# Patient Record
Sex: MALE | Race: BLACK OR AFRICAN AMERICAN | NOT HISPANIC OR LATINO | Employment: FULL TIME | URBAN - METROPOLITAN AREA
[De-identification: names, ages, dates, MRNs, and addresses within clinical notes are randomized per-mention and may not be internally consistent; named-entity substitution may affect disease eponyms.]

---

## 2019-07-15 ENCOUNTER — TRANSCRIBE ORDERS (OUTPATIENT)
Dept: LAB | Facility: HOSPITAL | Age: 54
End: 2019-07-15

## 2019-07-15 DIAGNOSIS — Z79.899 ENCOUNTER FOR LONG-TERM (CURRENT) USE OF OTHER MEDICATIONS: Primary | ICD-10-CM

## 2019-08-23 NOTE — PROGRESS NOTES
Assessment/Plan:    No problem-specific Assessment & Plan notes found for this encounter  {Assess/PlanSmartLinks:51426}      Subjective: Patient complain of lower back tightness   Referral to gastro pending   Patient given the phq9     Health Maintenance Due   Topic Date Due    Hepatitis C Screening  1965    Depression Screening PHQ  1965    CRC Screening: Colonoscopy  1965    BMI: Adult  07/18/1982    DTaP,Tdap,and Td Vaccines (1 - Tdap) 07/18/1985    INFLUENZA VACCINE  07/01/2019          Patient ID: Madison Flowers is a 53 y o  male  HPI    {Common ambulatory SmartLinks:41130}    Review of Systems      Objective: There were no vitals taken for this visit           Physical Exam

## 2019-08-27 ENCOUNTER — OFFICE VISIT (OUTPATIENT)
Dept: FAMILY MEDICINE CLINIC | Facility: CLINIC | Age: 54
End: 2019-08-27
Payer: COMMERCIAL

## 2019-08-27 VITALS
SYSTOLIC BLOOD PRESSURE: 110 MMHG | HEIGHT: 69 IN | BODY MASS INDEX: 25.21 KG/M2 | DIASTOLIC BLOOD PRESSURE: 80 MMHG | OXYGEN SATURATION: 98 % | RESPIRATION RATE: 16 BRPM | WEIGHT: 170.2 LBS | TEMPERATURE: 97.9 F | HEART RATE: 97 BPM

## 2019-08-27 DIAGNOSIS — Z23 ENCOUNTER FOR IMMUNIZATION: ICD-10-CM

## 2019-08-27 DIAGNOSIS — Z00.00 ANNUAL PHYSICAL EXAM: Primary | ICD-10-CM

## 2019-08-27 DIAGNOSIS — Z11.59 NEED FOR HEPATITIS C SCREENING TEST: ICD-10-CM

## 2019-08-27 DIAGNOSIS — Z13.228 SCREENING FOR ENDOCRINE, METABOLIC AND IMMUNITY DISORDER: ICD-10-CM

## 2019-08-27 DIAGNOSIS — Z13.6 SCREENING FOR CARDIOVASCULAR CONDITION: ICD-10-CM

## 2019-08-27 DIAGNOSIS — Z12.5 SCREENING FOR PROSTATE CANCER: ICD-10-CM

## 2019-08-27 DIAGNOSIS — F17.200 TOBACCO DEPENDENCE: ICD-10-CM

## 2019-08-27 DIAGNOSIS — Z12.2 ENCOUNTER FOR SCREENING FOR LUNG CANCER: ICD-10-CM

## 2019-08-27 DIAGNOSIS — Z13.29 SCREENING FOR ENDOCRINE, METABOLIC AND IMMUNITY DISORDER: ICD-10-CM

## 2019-08-27 DIAGNOSIS — Z13.1 SCREENING FOR DIABETES MELLITUS: ICD-10-CM

## 2019-08-27 DIAGNOSIS — Z12.11 SCREENING FOR COLON CANCER: ICD-10-CM

## 2019-08-27 DIAGNOSIS — Z13.0 SCREENING FOR ENDOCRINE, METABOLIC AND IMMUNITY DISORDER: ICD-10-CM

## 2019-08-27 PROBLEM — F41.9 ANXIETY: Status: ACTIVE | Noted: 2019-08-27

## 2019-08-27 PROBLEM — Z72.0 TOBACCO USER: Status: ACTIVE | Noted: 2017-12-04

## 2019-08-27 PROCEDURE — 90732 PPSV23 VACC 2 YRS+ SUBQ/IM: CPT

## 2019-08-27 PROCEDURE — 3725F SCREEN DEPRESSION PERFORMED: CPT | Performed by: FAMILY MEDICINE

## 2019-08-27 PROCEDURE — 99386 PREV VISIT NEW AGE 40-64: CPT | Performed by: FAMILY MEDICINE

## 2019-08-27 PROCEDURE — 90471 IMMUNIZATION ADMIN: CPT

## 2019-08-27 RX ORDER — BUSPIRONE HYDROCHLORIDE 15 MG/1
15 TABLET ORAL 3 TIMES DAILY
Refills: 0 | COMMUNITY
Start: 2019-08-20 | End: 2020-04-23

## 2019-08-27 RX ORDER — HYDROXYZINE PAMOATE 25 MG/1
CAPSULE ORAL
COMMUNITY
Start: 2018-11-12 | End: 2020-04-23

## 2019-08-27 NOTE — PROGRESS NOTES
ADULT ANNUAL PHYSICAL  [facility] - [facility]    NAME: Lily Aguilar  AGE: 53 y o  SEX: male  : 1965     DATE: 2019     Assessment and Plan:     Problem List Items Addressed This Visit     None      Visit Diagnoses     Annual physical exam    -  Primary    Screening for colon cancer        Relevant Orders    Occult Blood, Fecal Immunochemical    Screening for endocrine, metabolic and immunity disorder        Relevant Orders    CBC and Platelet    Comprehensive metabolic panel    TSH, 3rd generation with Free T4 reflex    Encounter for screening for lung cancer        Relevant Orders    CT lung screening program    Screening for prostate cancer        Relevant Orders    PSA, Total Screen    Screening for diabetes mellitus        Relevant Orders    Hemoglobin A1C    Screening for cardiovascular condition        Relevant Orders    Lipid panel    Encounter for immunization        Relevant Orders    PNEUMOCOCCAL POLYSACCHARIDE VACCINE 23-VALENT =>3YO SQ IM (Completed)    BMI 25 0-25 9,adult        Tobacco dependence        Need for hepatitis C screening test        Relevant Orders    Hepatitis C antibody          Immunizations and preventive care screenings were discussed with patient today  Appropriate education was printed on patient's after visit summary  Counseling:  BMI Counseling: Body mass index is 25 13 kg/m²  Discussed the patient's BMI with him  The BMI is above average  BMI counseling and education was provided to the patient  Nutrition recommendations include 3-5 servings of fruits/vegetables daily  Exercise recommendations include exercising 3-5 times per week  · Tobacco Cessation Counseling: Tobacco cessation counseling and education was provided  The patient is sincerely urged to quit consumption of tobacco  He is not ready to quit tobacco  The numerous health risks of tobacco consumption were discussed   If he decides to quit, there are a number of helpful adjunctive aids, and he can see me to discuss nicotine replacement therapy, chantix, or bupropion anytime in the future  Return in 1 year (on 8/27/2020)  Chief Complaint:     Chief Complaint   Patient presents with    Annual Exam      History of Present Illness:     Adult Annual Physical   Patient here for a comprehensive physical exam  The patient reports no problems  Diet and Physical Activity  · Diet/Nutrition: well balanced diet and limited fruits/vegetables  · Exercise: 3-4 times a week on average  Depression Screening  PHQ-9 Depression Screening    PHQ-9:    Frequency of the following problems over the past two weeks:       Little interest or pleasure in doing things:  0 - not at all  Feeling down, depressed, or hopeless:  0 - not at all  PHQ-2 Score:  0       General Health  · Sleep: sleeps well and gets 4-6 hours of sleep on average  · Hearing: normal - bilateral   · Vision: no vision problems  · Dental: regular dental visits and brushes teeth once daily   Health  · Symptoms include: none     Review of Systems:     Review of Systems   Musculoskeletal: Positive for back pain (intermittent low back pain) and myalgias (left posterior heel pain)  All other systems reviewed and are negative  Past Medical History:     Past Medical History:   Diagnosis Date    Anxiety     Substance abuse ([facility] [address] )       Past Surgical History:     No past surgical history on file     Family History:     Family History   Problem Relation Age of Onset    Stroke Family     Hypertension Family     Mental illness Family       Social History:     Social History     Socioeconomic History    Marital status: Single     Spouse name: None    Number of children: None    Years of education: None    Highest education level: None   Occupational History    Occupation: [employer]    Social Needs    Financial resource strain: None    Food insecurity:     Worry: None     Inability: None   Ashish Molina Transportation needs:     Medical: None     Non-medical: None   Tobacco Use    Smoking status: Current Every Day Smoker     Packs/day: 1 00     Years: 30 00     Pack years: 30 00     Types: Cigarettes    Smokeless tobacco: Never Used   Substance and Sexual Activity    Alcohol use: Not Currently     Frequency: Never    Drug use: Not Currently     Types: Cocaine    Sexual activity: None   Lifestyle    Physical activity:     Days per week: None     Minutes per session: None    Stress: None   Relationships    Social connections:     Talks on phone: None     Gets together: None     Attends Anglican service: None     Active member of club or organization: None     Attends meetings of clubs or organizations: None     Relationship status: None    Intimate partner violence:     Fear of current or ex partner: None     Emotionally abused: None     Physically abused: None     Forced sexual activity: None   Other Topics Concern    None   Social History Narrative    Single     Lives with roommates     No caffeine use     Regular exercise       Current Medications:     Current Outpatient Medications   Medication Sig Dispense Refill    busPIRone (BUSPAR) 15 mg tablet Take 15 mg by mouth 3 (three) times a day  0    hydrOXYzine pamoate (VISTARIL) 25 mg capsule hydroxyzine pamoate 25 mg capsule       No current facility-administered medications for this visit  Allergies: Allergies no known allergies   Physical Exam:     /80 (BP Location: Left arm, Patient Position: Sitting, Cuff Size: Adult)   Pulse 97   Temp 97 9 °F (36 6 °C) (Oral)   Resp 16   Ht 5' 9" (1 753 m)   Wt 77 2 kg (170 lb 3 2 oz)   SpO2 98%   BMI 25 13 kg/m²     Physical Exam   Constitutional: He is oriented to person, place, and time  He appears well-developed and well-nourished  He is cooperative  HENT:   Head: Normocephalic and atraumatic     Right Ear: Hearing, tympanic membrane, external ear and ear canal normal    Left Ear: Hearing, tympanic membrane, external ear and ear canal normal    Mouth/Throat: Uvula is midline, oropharynx is clear and moist and mucous membranes are normal    Eyes: Pupils are equal, round, and reactive to light  Conjunctivae and lids are normal    Neck: Trachea normal and normal range of motion  Neck supple  No thyromegaly present  Cardiovascular: Normal rate, regular rhythm and normal heart sounds  No murmur heard  Pulses:       Posterior tibial pulses are 2+ on the right side, and 2+ on the left side  Pulmonary/Chest: Effort normal and breath sounds normal  He has no decreased breath sounds  He has no wheezes  He has no rhonchi  He has no rales  Abdominal: Soft  Normal appearance and bowel sounds are normal  There is no hepatosplenomegaly  There is no tenderness  Musculoskeletal: Normal range of motion  Right ankle: He exhibits no swelling  Left ankle: He exhibits no swelling  Lymphadenopathy:        Head (right side): No submandibular adenopathy present  Head (left side): No submandibular adenopathy present  He has no cervical adenopathy  Neurological: He is alert and oriented to person, place, and time  No cranial nerve deficit  He exhibits normal muscle tone  Gait normal    Skin: Skin is warm, dry and intact  Psychiatric: He has a normal mood and affect  His speech is normal and behavior is normal    Nursing note and vitals reviewed        Thee Redding MD  [address] [place] [address]

## 2019-08-27 NOTE — PATIENT INSTRUCTIONS
Wellness Visit for Adults   AMBULATORY CARE:   A wellness visit  is when you see your healthcare provider to get screened for health problems  You can also get advice on how to stay healthy  Write down your questions so you remember to ask them  Ask your healthcare provider how often you should have a wellness visit  What happens at a wellness visit:  Your healthcare provider will ask about your health, and your family history of health problems  This includes high blood pressure, heart disease, and cancer  He or she will ask if you have symptoms that concern you, if you smoke, and about your mood  You may also be asked about your intake of medicines, supplements, food, and alcohol  Any of the following may be done:  · Your weight  will be checked  Your height may also be checked so your body mass index (BMI) can be calculated  Your BMI shows if you are at a healthy weight  · Your blood pressure  and heart rate will be checked  Your temperature may also be checked  · Blood and urine tests  may be done  Blood tests may be done to check your cholesterol levels  Abnormal cholesterol levels increase your risk for heart disease and stroke  You may also need a blood or urine test to check for diabetes if you are at increased risk  Urine tests may be done to look for signs of an infection or kidney disease  · A physical exam  includes checking your heartbeat and lungs with a stethoscope  Your healthcare provider may also check your skin to look for sun damage  · Screening tests  may be recommended  A screening test is done to check for diseases that may not cause symptoms  The screening tests you may need depend on your age, gender, family history, and lifestyle habits  For example, colorectal screening may be recommended if you are 48years old or older  Screening tests you need if you are a woman:   · A Pap smear  is used to screen for cervical cancer   Pap smears are usually done every 3 to 5 years depending on your age  You may need them more often if you have had abnormal Pap smear test results in the past  Ask your healthcare provider how often you should have a Pap smear  · A mammogram  is an x-ray of your breasts to screen for breast cancer  Experts recommend mammograms every 2 years starting at age 48 years  You may need a mammogram at age 52 years or younger if you have an increased risk for breast cancer  Talk to your healthcare provider about when you should start having mammograms and how often you need them  Vaccines you may need:   · Get an influenza vaccine  every year  The influenza vaccine protects you from the flu  Several types of viruses cause the flu  The viruses change over time, so new vaccines are made each year  · Get a tetanus-diphtheria (Td) booster vaccine  every 10 years  This vaccine protects you against tetanus and diphtheria  Tetanus is a severe infection that may cause painful muscle spasms and lockjaw  Diphtheria is a severe bacterial infection that causes a thick covering in the back of your mouth and throat  · Get a human papillomavirus (HPV) vaccine  if you are female and aged 23 to 32 or male 23 to 24 and never received it  This vaccine protects you from HPV infection  HPV is the most common infection spread by sexual contact  HPV may also cause vaginal, penile, and anal cancers  · Get a pneumococcal vaccine  if you are aged 72 years or older  The pneumococcal vaccine is an injection given to protect you from pneumococcal disease  Pneumococcal disease is an infection caused by pneumococcal bacteria  The infection may cause pneumonia, meningitis, or an ear infection  · Get a shingles vaccine  if you are aged 61 or older, even if you have had shingles before  The shingles vaccine is an injection to protect you from the varicella-zoster virus  This is the same virus that causes chickenpox   Shingles is a painful rash that develops in people who had chickenpox or have been exposed to the virus  How to eat healthy:  My Plate is a model for planning healthy meals  It shows the types and amounts of foods that should go on your plate  Fruits and vegetables make up about half of your plate, and grains and protein make up the other half  A serving of dairy is included on the side of your plate  The amount of calories and serving sizes you need depends on your age, gender, weight, and height  Examples of healthy foods are listed below:  · Eat a variety of vegetables  such as dark green, red, and orange vegetables  You can also include canned vegetables low in sodium (salt) and frozen vegetables without added butter or sauces  · Eat a variety of fresh fruits , canned fruit in 100% juice, frozen fruit, and dried fruit  · Include whole grains  At least half of the grains you eat should be whole grains  Examples include whole-wheat bread, wheat pasta, brown rice, and whole-grain cereals such as oatmeal     · Eat a variety of protein foods such as seafood (fish and shellfish), lean meat, and poultry without skin (turkey and chicken)  Examples of lean meats include pork leg, shoulder, or tenderloin, and beef round, sirloin, tenderloin, and extra lean ground beef  Other protein foods include eggs and egg substitutes, beans, peas, soy products, nuts, and seeds  · Choose low-fat dairy products such as skim or 1% milk or low-fat yogurt, cheese, and cottage cheese  · Limit unhealthy fats  such as butter, hard margarine, and shortening  Exercise:  Exercise at least 30 minutes per day on most days of the week  Some examples of exercise include walking, biking, dancing, and swimming  You can also fit in more physical activity by taking the stairs instead of the elevator or parking farther away from stores  Include muscle strengthening activities 2 days each week  Regular exercise provides many health benefits   It helps you manage your weight, and decreases your risk for type 2 diabetes, heart disease, stroke, and high blood pressure  Exercise can also help improve your mood  Ask your healthcare provider about the best exercise plan for you  General health and safety guidelines:   · Do not smoke  Nicotine and other chemicals in cigarettes and cigars can cause lung damage  Ask your healthcare provider for information if you currently smoke and need help to quit  E-cigarettes or smokeless tobacco still contain nicotine  Talk to your healthcare provider before you use these products  · Limit alcohol  A drink of alcohol is 12 ounces of beer, 5 ounces of wine, or 1½ ounces of liquor  · Lose weight, if needed  Being overweight increases your risk of certain health conditions  These include heart disease, high blood pressure, type 2 diabetes, and certain types of cancer  · Protect your skin  Do not sunbathe or use tanning beds  Use sunscreen with a SPF 15 or higher  Apply sunscreen at least 15 minutes before you go outside  Reapply sunscreen every 2 hours  Wear protective clothing, hats, and sunglasses when you are outside  · Drive safely  Always wear your seatbelt  Make sure everyone in your car wears a seatbelt  A seatbelt can save your life if you are in an accident  Do not use your cell phone when you are driving  This could distract you and cause an accident  Pull over if you need to make a call or send a text message  · Practice safe sex  Use latex condoms if are sexually active and have more than one partner  Your healthcare provider may recommend screening tests for sexually transmitted infections (STIs)  · Wear helmets, lifejackets, and protective gear  Always wear a helmet when you ride a bike or motorcycle, go skiing, or play sports that could cause a head injury  Wear protective equipment when you play sports  Wear a lifejacket when you are on a boat or doing water sports    © 2017 [employer] Information is for End User's use only and may not be sold, redistributed or otherwise used for commercial purposes  All illustrations and images included in CareNotes® are the copyrighted property of A D A M , Inc  or Tremaine Mas  The above information is an  only  It is not intended as medical advice for individual conditions or treatments  Talk to your doctor, nurse or pharmacist before following any medical regimen to see if it is safe and effective for you  Weight Management   AMBULATORY CARE:   Why it is important to manage your weight:  Being overweight increases your risk of health conditions such as heart disease, high blood pressure, type 2 diabetes, and certain types of cancer  It can also increase your risk for osteoarthritis, sleep apnea, and other respiratory problems  Aim for a slow, steady weight loss  Even a small amount of weight loss can lower your risk of health problems  How to lose weight safely:  A safe and healthy way to lose weight is to eat fewer calories and get regular exercise  You can lose up about 1 pound a week by decreasing the number of calories you eat by 500 calories each day  You can decrease calories by eating smaller portion sizes or by cutting out high-calorie foods  Read labels to find out how many calories are in the foods you eat  You can also burn calories with exercise such as walking, swimming, or biking  You will be more likely to keep weight off if you make these changes part of your lifestyle  Healthy meal plan for weight management:  A healthy meal plan includes a variety of foods, contains fewer calories, and helps you stay healthy  A healthy meal plan includes the following:  · Eat whole-grain foods more often  A healthy meal plan should contain fiber  Fiber is the part of grains, fruits, and vegetables that is not broken down by your body  Whole-grain foods are healthy and provide extra fiber in your diet   Some examples of whole-grain foods are whole-wheat breads and pastas, oatmeal, brown rice, and bulgur  · Eat a variety of vegetables every day  Include dark, leafy greens such as spinach, kale, maria eugenia greens, and mustard greens  Eat yellow and orange vegetables such as carrots, sweet potatoes, and winter squash  · Eat a variety of fruits every day  Choose fresh or canned fruit (canned in its own juice or light syrup) instead of juice  Fruit juice has very little or no fiber  · Eat low-fat dairy foods  Drink fat-free (skim) milk or 1% milk  Eat fat-free yogurt and low-fat cottage cheese  Try low-fat cheeses such as mozzarella and other reduced-fat cheeses  · Choose meat and other protein foods that are low in fat  Choose beans or other legumes such as split peas or lentils  Choose fish, skinless poultry (chicken or turkey), or lean cuts of red meat (beef or pork)  Before you cook meat or poultry, cut off any visible fat  · Use less fat and oil  Try baking foods instead of frying them  Add less fat, such as margarine, sour cream, regular salad dressing and mayonnaise to foods  Eat fewer high-fat foods  Some examples of high-fat foods include french fries, doughnuts, ice cream, and cakes  · Eat fewer sweets  Limit foods and drinks that are high in sugar  This includes candy, cookies, regular soda, and sweetened drinks  Ways to decrease calories:   · Eat smaller portions  ¨ Use a small plate with smaller servings  ¨ Do not eat second helpings  ¨ When you eat at a restaurant, ask for a box and place half of your meal in the box before you eat  ¨ Share an entrée with someone else  · Replace high-calorie snacks with healthy, low-calorie snacks  ¨ Choose fresh fruit, vegetables, fat-free rice cakes, or air-popped popcorn instead of potato chips, nuts, or chocolate  ¨ Choose water or calorie-free drinks instead of soda or sweetened drinks  · Eat regular meals  Skipping meals can lead to overeating later in the day   Eat a healthy snack in place of a meal if you do not have time to eat a regular meal      · Do not shop for groceries when you are hungry  You may be more likely to make unhealthy food choices  Take a grocery list of healthy foods and shop after you have eaten  Exercise:  Exercise at least 30 minutes per day on most days of the week  Some examples of exercise include walking, biking, dancing, and swimming  You can also fit in more physical activity by taking the stairs instead of the elevator or parking farther away from stores  Ask your healthcare provider about the best exercise plan for you  Other things to consider as you try to lose weight:   · Be aware of situations that may give you the urge to overeat, such as eating while watching television  Find ways to avoid these situations  For example, read a book, go for a walk, or do crafts  · Meet with a weight loss support group or friends who are also trying to lose weight  This may help you stay motivated to continue working on your weight loss goals  © 2017 [address] [employer] Information is for End User's use only and may not be sold, redistributed or otherwise used for commercial purposes  All illustrations and images included in CareNotes® are the copyrighted property of A D A M , Inc  or Tremaine Chace  The above information is an  only  It is not intended as medical advice for individual conditions or treatments  Talk to your doctor, nurse or pharmacist before following any medical regimen to see if it is safe and effective for you  Cigarette Smoking and Your Health   AMBULATORY CARE:   Risks to your health if you smoke:  Nicotine and other chemicals found in tobacco damage every cell in your body  Even if you are a light smoker, you have an increased risk for cancer, heart disease, and lung disease  If you are pregnant or have diabetes, smoking increases your risk for complications     Benefits to your health if you stop smoking:   · You decrease respiratory symptoms such as coughing, wheezing, and shortness of breath  · You reduce your risk for cancers of the lung, mouth, throat, kidney, bladder, pancreas, stomach, and cervix  If you already have cancer, you increase the benefits of chemotherapy  You also reduce your risk for cancer returning or a second cancer from developing  · You reduce your risk for heart disease, blood clots, heart attack, and stroke  · You reduce your risk for lung infections, and diseases such as pneumonia, asthma, chronic bronchitis, and emphysema  · Your circulation improves  More oxygen can be delivered to your body  If you have diabetes, you lower your risk for complications, such as kidney, artery, and eye diseases  You also lower your risk for nerve damage  Nerve damage can lead to amputations, poor vision, and blindness  · You improve your body's ability to heal and to fight infections  Benefits to the health of others if you stop smoking:  Tobacco is harmful to nonsmokers who breathe in your secondhand smoke  The following are ways the health of others around you may improve when you stop smoking:  · You lower the risks for lung cancer and heart disease in nonsmoking adults  · If you are pregnant, you lower the risk for miscarriage, early delivery, low birth weight, and stillbirth  You also lower your baby's risk for SIDS, obesity, developmental delay, and neurobehavioral problems, such as ADHD  · If you have children, you lower their risk for ear infections, colds, pneumonia, bronchitis, and asthma  For more information and support to stop smoking:   · Smokefree  gov  Phone: 7- 444 - 151-0009  Web Address: www [employer]  Follow up with your healthcare provider as directed:  Write down your questions so you remember to ask them during your visits     © 2017 Lalitha Fernández Information is for End User's use only and may not be sold, redistributed or otherwise used for commercial purposes  All illustrations and images included in CareNotes® are the copyrighted property of [employer] M , [employer]  or Tremaine Mas  The above information is an  only  It is not intended as medical advice for individual conditions or treatments  Talk to your doctor, nurse or pharmacist before following any medical regimen to see if it is safe and effective for you

## 2020-04-17 ENCOUNTER — TELEPHONE (OUTPATIENT)
Dept: PSYCHIATRY | Facility: CLINIC | Age: 55
End: 2020-04-17

## 2020-04-23 ENCOUNTER — TELEMEDICINE (OUTPATIENT)
Dept: PSYCHIATRY | Facility: CLINIC | Age: 55
End: 2020-04-23
Payer: COMMERCIAL

## 2020-04-23 DIAGNOSIS — F41.9 ANXIETY: ICD-10-CM

## 2020-04-23 DIAGNOSIS — F43.22 ADJUSTMENT DISORDER WITH ANXIOUS MOOD: Primary | ICD-10-CM

## 2020-04-23 PROBLEM — F43.20 ADJUSTMENT DISORDER: Status: ACTIVE | Noted: 2020-04-23

## 2020-04-23 PROCEDURE — 99205 OFFICE O/P NEW HI 60 MIN: CPT | Performed by: NURSE PRACTITIONER

## 2020-04-23 RX ORDER — HYDROXYZINE HYDROCHLORIDE 10 MG/1
10 TABLET, FILM COATED ORAL 2 TIMES DAILY PRN
Qty: 60 TABLET | Refills: 1 | Status: SHIPPED | OUTPATIENT
Start: 2020-04-23 | End: 2022-02-08 | Stop reason: SDUPTHER

## 2020-04-23 RX ORDER — ESCITALOPRAM OXALATE 10 MG/1
10 TABLET ORAL DAILY
Qty: 30 TABLET | Refills: 1 | Status: SHIPPED | OUTPATIENT
Start: 2020-04-23 | End: 2020-05-26 | Stop reason: SDUPTHER

## 2020-05-26 ENCOUNTER — TELEMEDICINE (OUTPATIENT)
Dept: PSYCHIATRY | Facility: CLINIC | Age: 55
End: 2020-05-26
Payer: COMMERCIAL

## 2020-05-26 DIAGNOSIS — F41.9 ANXIETY: ICD-10-CM

## 2020-05-26 DIAGNOSIS — F43.22 ADJUSTMENT DISORDER WITH ANXIOUS MOOD: Primary | ICD-10-CM

## 2020-05-26 PROCEDURE — 99213 OFFICE O/P EST LOW 20 MIN: CPT | Performed by: NURSE PRACTITIONER

## 2020-05-26 RX ORDER — ESCITALOPRAM OXALATE 10 MG/1
10 TABLET ORAL DAILY
Qty: 30 TABLET | Refills: 3 | Status: SHIPPED | OUTPATIENT
Start: 2020-05-26 | End: 2020-08-27 | Stop reason: SDUPTHER

## 2020-08-27 ENCOUNTER — TELEPHONE (OUTPATIENT)
Dept: PSYCHIATRY | Facility: CLINIC | Age: 55
End: 2020-08-27

## 2020-08-27 DIAGNOSIS — F43.22 ADJUSTMENT DISORDER WITH ANXIOUS MOOD: ICD-10-CM

## 2020-08-27 DIAGNOSIS — F41.9 ANXIETY: ICD-10-CM

## 2020-08-27 RX ORDER — ESCITALOPRAM OXALATE 10 MG/1
10 TABLET ORAL DAILY
Qty: 30 TABLET | Refills: 3 | Status: SHIPPED | OUTPATIENT
Start: 2020-08-27 | End: 2020-08-31

## 2020-08-31 ENCOUNTER — TELEMEDICINE (OUTPATIENT)
Dept: PSYCHIATRY | Facility: CLINIC | Age: 55
End: 2020-08-31
Payer: COMMERCIAL

## 2020-08-31 DIAGNOSIS — F41.1 GAD (GENERALIZED ANXIETY DISORDER): Primary | ICD-10-CM

## 2020-08-31 DIAGNOSIS — F43.22 ADJUSTMENT DISORDER WITH ANXIOUS MOOD: ICD-10-CM

## 2020-08-31 DIAGNOSIS — F41.9 ANXIETY: ICD-10-CM

## 2020-08-31 PROCEDURE — 99214 OFFICE O/P EST MOD 30 MIN: CPT | Performed by: NURSE PRACTITIONER

## 2020-08-31 RX ORDER — ESCITALOPRAM OXALATE 10 MG/1
15 TABLET ORAL DAILY
Qty: 135 TABLET | Refills: 1 | Status: SHIPPED | OUTPATIENT
Start: 2020-08-31 | End: 2020-09-21 | Stop reason: SDUPTHER

## 2020-08-31 NOTE — PSYCH
Virtual Regular Visit    Problem List Items Addressed This Visit        Other    Adjustment disorder    Relevant Medications    escitalopram (LEXAPRO) 10 mg tablet    KAITLIN (generalized anxiety disorder) - Primary    Relevant Medications    escitalopram (LEXAPRO) 10 mg tablet    RESOLVED: Anxiety        It was my intent to perform this visit via video technology but the patient was not able to do a video connection so the visit was completed via audio telephone only  Encounter provider AUNDREA Weldon    Provider located at   [address] [place] [id number] [phone]    Recent Visits  Date Type Provider Dept   08/27/20 Telephone [address] recent visits within past 7 days and meeting all other requirements     Today's Visits  Date Type Provider Dept   08/31/20 Telemedicine AUNDREA Price  [facility]   Showing today's visits and meeting all other requirements     Future Appointments  No visits were found meeting these conditions  Showing future appointments within next 150 days and meeting all other requirements      The patient was identified by name and date of birth  Torres Tillman was informed that this is a telemedicine visit and that the visit is being conducted through [employer]  My office door was closed  No one else was in the room  He acknowledged consent and understanding of privacy and security of the video platform  The patient has agreed to participate and understands they can discontinue the visit at any time  Patient is aware this is a billable service       HPI     Current Outpatient Medications   Medication Sig Dispense Refill    escitalopram (LEXAPRO) 10 mg tablet Take 1 5 tablets (15 mg total) by mouth daily 135 tablet 1    hydrOXYzine HCL (ATARAX) 10 mg tablet Take 1 tablet (10 mg total) by mouth 2 (two) times a day as needed (anxiety/insomnia) 60 tablet 1     No current facility-administered medications for this visit  Review of Systems  Video Exam    There were no vitals filed for this visit  Physical Exam   As a result of this visit, I have referred the patient for further respiratory evaluation  No    I spent 18 minutes directly with the patient during this visit  VIRTUAL VISIT DISCLAIMER    Duane Clotilda Ginsberg acknowledges that he has consented to an online visit or consultation  He understands that the online visit is based solely on information provided by him, and that, in the absence of a face-to-face physical evaluation by the physician, the diagnosis he receives is both limited and provisional in terms of accuracy and completeness  This is not intended to replace a full medical face-to-face evaluation by the physician  Duane Clotilda Ginsberg understands and accepts these terms  MEDICATION MANAGEMENT NOTE        Merged with [facility]    Name and Date of Birth:  Gabriela Mclean 63 y o  1965 MRN: [id number]    Date of Visit: August 31, 2020    No Known Allergies  SUBJECTIVE:    Denver Copeland is seen today for a follow up for depression and anxiety  He reports that he has done well since the last visit  Patient reports that depression is well controlled that anxiety has improved since starting Lexapro  Patient no longer needs to use p r n  Atarax for anxiety  Patient states that he still does struggle with feelings of anxiousness throughout the day however  Patient worries about his parents who are older and out of state  Patient is doing well maintaining sobriety and is thinking of moving out of [facility] at this time    He denies any side effects from medications  PLAN:  Increase Lexapro to 15 mg p o   Daily  Follow-up in 3 months with in person office visit  Aware of 24 hour and weekend coverage for urgent situations accessed by calling [facility] main practice number    Diagnoses and all orders for this visit:    KAITLIN (generalized anxiety disorder)  -     escitalopram (LEXAPRO) 10 mg tablet; Take 1 5 tablets (15 mg total) by mouth daily    Adjustment disorder with anxious mood  -     escitalopram (LEXAPRO) 10 mg tablet; Take 1 5 tablets (15 mg total) by mouth daily    Anxiety        Psychotherapy Provided:     Individual psychotherapy provided: No    HPI ROS Appetite Changes and Sleep:     He reports normal sleep, adequate appetite, adequate energy level   Patient denies suicidal or homicidal ideation    Review Of Systems:      General emotional problems and decreased functioning   Personality no change in personality   Constitutional negative   ENT negative   Cardiovascular negative   Respiratory negative   Gastrointestinal negative   Genitourinary negative   Musculoskeletal negative   Integumentary negative   Neurological negative   Endocrine negative   Other Symptoms none, all other systems are negative     Mental Status Evaluation:    Appearance Unable to assess   Behavior cooperative   Mood anxious  Depression Scale -  of 10 (0 = No depression)  Anxiety Scale -  of 10 (0 = No anxiety)   Speech Normal rate and volume   Affect Unable to assess   Thought Processes Goal directed and coherent   Thought Content Does not verbalize delusional material   Associations Tightly connected   Perceptual Disturbances Denies hallucinations and does not appear to be responding to internal stimuli   Risk Potential Suicidal/Homicidal Ideation - No evidence of suicidal or homicidal ideation and Patient does not verbalize suicidal or homicidal ideation  Risk of Violence - No evidence of risk for violence found on assessment  Risk of Self Mutilation - No evidence of risk for self mutilation found on assessment   Orientation oriented to person, place, time/date and situation   Memory recent and remote memory grossly intact   Consciousness alert and awake   Attention/Concentration attention span and concentration are age appropriate   Insight fair   Judgement fair   Muscle Strength and Gait normal muscle strength and normal muscle tone, normal gait/station and normal balance   Motor Activity no abnormal movements   Language no difficulty naming common objects, no difficulty repeating a phrase, no difficulty writing a sentence   Fund of Knowledge adequate knowledge of current events  adequate fund of knowledge regarding past history  adequate fund of knowledge regarding vocabulary      Past Psychiatric History Update:     Inpatient Psychiatric Admission Since Last Encounter:   no  Changes to Outpatient Psychiatric Treatment Team:    no  Suicide Attempt Or Self Mutilation Since Last Encounter:   no  Incidence of Violent Behavior Since Last Encounter:   no    Traumatic History Update:     New Onset of Abuse Since Last Encounter:   no  Traumatic Events Since Last Encounter:   no    Past Medical History:    Past Medical History:   Diagnosis Date    Anxiety     [facility]      No past medical history pertinent negatives  No past surgical history on file    No Known Allergies  Substance Abuse History:    Social History     Substance and Sexual Activity   Alcohol Use Not Currently    Frequency: Never     Social History     Substance and Sexual Activity   Drug Use Not Currently    Types: Cocaine     Social History:    Social History     Socioeconomic History    Marital status: Single     Spouse name: Not on file    Number of children: Not on file    Years of education: Not on file    Highest education level: Not on file   Occupational History    Occupation: [address] Financial resource strain: Not on file    Food insecurity     Worry: Not on file     Inability: Not on file   [employer] needs     Medical: Not on file     Non-medical: Not on file   Tobacco Use    Smoking status: Current Every Day Smoker     Packs/day: 1 00     Years: 30 00     Pack years: 30 00     Types: Cigarettes    Smokeless tobacco: Never Used   Substance and Sexual Activity    Alcohol use: Not Currently     Frequency: Never    Drug use: Not Currently     Types: Cocaine    Sexual activity: Not on file   Lifestyle    Physical activity     Days per week: Not on file     Minutes per session: Not on file    Stress: Not on file   Relationships    Social connections     Talks on phone: Not on file     Gets together: Not on file     Attends Bahai service: Not on file     Active member of club or organization: Not on file     Attends meetings of clubs or organizations: Not on file     Relationship status: Not on file    Intimate partner violence     Fear of current or ex partner: Not on file     Emotionally abused: Not on file     Physically abused: Not on file     Forced sexual activity: Not on file   Other Topics Concern    Not on file   Social History Narrative    Single     Lives with roommates     No caffeine use     Regular exercise      Family Psychiatric History:     Family History   Problem Relation Age of Onset    Stroke Family     Hypertension Family     Mental illness Family      History Review: The following portions of the patient's history were reviewed and updated as appropriate: allergies, current medications, past family history, past medical history, past social history, past surgical history and problem list     OBJECTIVE:     Vital signs in last 24 hours: There were no vitals filed for this visit  Laboratory Results: I have personally reviewed all pertinent laboratory/tests results  Suicide/Homicide Risk Assessment:    Risk of Harm to Self:   The following ratings are based on assessment at the time of the interview   Recent Specific Risk Factors include: current anxiety symptoms    Risk of Harm to Others:   The following ratings are based on assessment at the time of the interview   Recent Specific Risk Factors include: none      The following interventions are recommended: no intervention changes needed    Medications Risks/Benefits:      Risks, Benefits And Possible Side Effects Of Medications:    Discussed risks and benefits of treatment with patient including risk of suicidality and serotonin syndrome related to treatment with antidepressants;  Risk of induction of manic symptoms in certain patient populations     Controlled Medication Discussion:     Not applicable    Treatment Plan:    Due for update/Updated:   AUNDREA Carroll 08/31/20

## 2020-09-21 ENCOUNTER — TELEPHONE (OUTPATIENT)
Dept: PSYCHIATRY | Facility: CLINIC | Age: 55
End: 2020-09-21

## 2020-09-21 DIAGNOSIS — F43.22 ADJUSTMENT DISORDER WITH ANXIOUS MOOD: ICD-10-CM

## 2020-09-21 DIAGNOSIS — F41.1 GAD (GENERALIZED ANXIETY DISORDER): ICD-10-CM

## 2020-09-21 RX ORDER — ESCITALOPRAM OXALATE 5 MG/1
5 TABLET ORAL DAILY
Qty: 90 TABLET | Refills: 1 | Status: SHIPPED | OUTPATIENT
Start: 2020-09-21 | End: 2020-12-01 | Stop reason: SDUPTHER

## 2020-09-21 RX ORDER — ESCITALOPRAM OXALATE 10 MG/1
10 TABLET ORAL DAILY
Qty: 90 TABLET | Refills: 1
Start: 2020-09-21 | End: 2020-12-01 | Stop reason: SDUPTHER

## 2020-09-21 NOTE — TELEPHONE ENCOUNTER
Called [facility] to clarify medication problem  Per pharmacist, [facility] [employer] insurance will not cover his Lexapro 1 5 tablets daily  Therefore they need a new script submitted for just the 5 MG tablets  Patient already picked up the 10 MG tablets  Will refer to Elsa Baker for review

## 2020-09-21 NOTE — TELEPHONE ENCOUNTER
Duane called and left a message  He said that his insurance is not covering one of his medications unless it is increased  He said to call the pharmacy and they can explain it to you  He said the insurance company would cover it if her went up to 15 mg instead of 10mg       I believe he is talking about his Atarax 10mg

## 2020-12-01 ENCOUNTER — TELEMEDICINE (OUTPATIENT)
Dept: PSYCHIATRY | Facility: CLINIC | Age: 55
End: 2020-12-01
Payer: COMMERCIAL

## 2020-12-01 DIAGNOSIS — F43.22 ADJUSTMENT DISORDER WITH ANXIOUS MOOD: ICD-10-CM

## 2020-12-01 DIAGNOSIS — F41.1 GAD (GENERALIZED ANXIETY DISORDER): Primary | ICD-10-CM

## 2020-12-01 PROBLEM — F43.20 ADJUSTMENT DISORDER: Status: RESOLVED | Noted: 2020-04-23 | Resolved: 2020-12-01

## 2020-12-01 PROCEDURE — 99213 OFFICE O/P EST LOW 20 MIN: CPT | Performed by: NURSE PRACTITIONER

## 2020-12-01 RX ORDER — ESCITALOPRAM OXALATE 10 MG/1
10 TABLET ORAL DAILY
Qty: 90 TABLET | Refills: 1 | Status: SHIPPED | OUTPATIENT
Start: 2020-12-01 | End: 2021-03-09 | Stop reason: SDUPTHER

## 2020-12-01 RX ORDER — ESCITALOPRAM OXALATE 5 MG/1
5 TABLET ORAL DAILY
Qty: 90 TABLET | Refills: 1 | Status: SHIPPED | OUTPATIENT
Start: 2020-12-01 | End: 2021-03-09

## 2020-12-01 RX ORDER — ESCITALOPRAM OXALATE 10 MG/1
10 TABLET ORAL DAILY
Qty: 90 TABLET | Refills: 1
Start: 2020-12-01 | End: 2020-12-01 | Stop reason: SDUPTHER

## 2021-02-08 ENCOUNTER — OFFICE VISIT (OUTPATIENT)
Dept: FAMILY MEDICINE CLINIC | Facility: CLINIC | Age: 56
End: 2021-02-08
Payer: COMMERCIAL

## 2021-02-08 VITALS
BODY MASS INDEX: 24.74 KG/M2 | DIASTOLIC BLOOD PRESSURE: 86 MMHG | WEIGHT: 172.8 LBS | HEART RATE: 72 BPM | HEIGHT: 70 IN | RESPIRATION RATE: 16 BRPM | SYSTOLIC BLOOD PRESSURE: 120 MMHG | TEMPERATURE: 97.1 F | OXYGEN SATURATION: 98 %

## 2021-02-08 DIAGNOSIS — Z11.59 NEED FOR HEPATITIS C SCREENING TEST: ICD-10-CM

## 2021-02-08 DIAGNOSIS — H61.22 HEARING LOSS OF LEFT EAR DUE TO CERUMEN IMPACTION: ICD-10-CM

## 2021-02-08 DIAGNOSIS — Z11.4 SCREENING FOR HIV (HUMAN IMMUNODEFICIENCY VIRUS): ICD-10-CM

## 2021-02-08 DIAGNOSIS — Z72.0 TOBACCO USER: ICD-10-CM

## 2021-02-08 DIAGNOSIS — Z13.220 SCREENING FOR HYPERLIPIDEMIA: ICD-10-CM

## 2021-02-08 DIAGNOSIS — Z00.00 ANNUAL PHYSICAL EXAM: Primary | ICD-10-CM

## 2021-02-08 DIAGNOSIS — Z13.1 SCREENING FOR DIABETES MELLITUS (DM): ICD-10-CM

## 2021-02-08 DIAGNOSIS — Z13.0 SCREENING, ANEMIA, DEFICIENCY, IRON: ICD-10-CM

## 2021-02-08 DIAGNOSIS — Z12.11 SCREENING FOR COLON CANCER: ICD-10-CM

## 2021-02-08 DIAGNOSIS — F41.1 GAD (GENERALIZED ANXIETY DISORDER): ICD-10-CM

## 2021-02-08 PROCEDURE — 3725F SCREEN DEPRESSION PERFORMED: CPT | Performed by: FAMILY MEDICINE

## 2021-02-08 PROCEDURE — 99396 PREV VISIT EST AGE 40-64: CPT | Performed by: FAMILY MEDICINE

## 2021-02-08 PROCEDURE — 3008F BODY MASS INDEX DOCD: CPT | Performed by: NURSE PRACTITIONER

## 2021-02-08 NOTE — PATIENT INSTRUCTIONS

## 2021-02-08 NOTE — PROGRESS NOTES
ADULT ANNUAL PHYSICAL  [facility]    NAME: Carlos Murrell  AGE: 63 y o  SEX: male  : 1965     DATE: 2021     Assessment and Plan:     Problem List Items Addressed This Visit        Other    Tobacco user     Tobacco Cessation Counseling: Tobacco cessation counseling and education was provided  The patient is sincerely urged to quit consumption of tobacco  He is not ready to quit tobacco  The numerous health risks of tobacco consumption were discussed  If he decides to quit, there are a number of helpful adjunctive aids, and he can see me to discuss nicotine replacement therapy, chantix, or bupropion anytime in the future  KAITLIN (generalized anxiety disorder)     Controlled on lexapro and hydroxyzine  Goes to psych  Other Visit Diagnoses     Annual physical exam    -  Primary    Screening for colon cancer        Relevant Orders    Ambulatory referral to Gastroenterology    Screening for diabetes mellitus (DM)        Relevant Orders    Comprehensive metabolic panel    Screening for hyperlipidemia        Relevant Orders    Lipid panel    Need for hepatitis C screening test        Relevant Orders    Hepatitis C antibody    Screening for HIV (human immunodeficiency virus)        Relevant Orders    HIV 1/2 Antigen/Antibody (4th Generation) w Reflex [facility]    Screening, anemia, deficiency, iron        Relevant Orders    CBC and differential    Hearing loss of left ear due to cerumen impaction        Usee debrox bid for 5 days  If no resolution after this, make appt for lavage  Relevant Medications    carbamide peroxide (DEBROX) 6 5 % otic solution          Immunizations and preventive care screenings were discussed with patient today  Appropriate education was printed on patient's after visit summary  Refuses flu and tdap today  Understands risks       Counseling:  Alcohol/drug use: discussed moderation in alcohol intake, the recommendations for healthy alcohol use, and avoidance of illicit drug use  Dental Health: discussed importance of regular tooth brushing, flossing, and dental visits  · Exercise: the importance of regular exercise/physical activity was discussed  Recommend exercise 3-5 times per week for at least 30 minutes  Return in 1 year (on 2/8/2022) for Annual physical      Chief Complaint:     Chief Complaint   Patient presents with    Physical Exam      History of Present Illness:     Adult Annual Physical   Patient here for a comprehensive physical exam  Ho tobacco use, anxiety and alcohol and cocaine abuse  He is in remission for 2 years via NA  No labs available  The patient reports no problems  Diet and Physical Activity  · Diet/Nutrition: well balanced diet  · Exercise: moderate cardiovascular exercise  Depression Screening  PHQ-9 Depression Screening    PHQ-9:   Frequency of the following problems over the past two weeks:      Little interest or pleasure in doing things: 0 - not at all  Feeling down, depressed, or hopeless: 0 - not at all  PHQ-2 Score: 0       General Health  · Sleep: sleeps well  · Hearing: no issues  · Vision: no vision problems  · Dental: regular dental visits   Health  · History of STDs?: no   · No urinary complaints  · Needs colonoscopy     Review of Systems:     Review of Systems   Constitutional: Negative for activity change, appetite change, fever and unexpected weight change  HENT: Negative for ear pain, postnasal drip and rhinorrhea  Eyes: Negative for photophobia and pain  Respiratory: Negative for cough, shortness of breath and wheezing  Cardiovascular: Negative for chest pain, palpitations and leg swelling  Gastrointestinal: Negative for abdominal pain, blood in stool, nausea and vomiting  Endocrine: Negative for polydipsia and polyuria  Genitourinary: Negative for difficulty urinating, hematuria and urgency     Musculoskeletal: Negative for myalgias  Skin: Negative for rash  Neurological: Negative for dizziness  Psychiatric/Behavioral: Negative for confusion and sleep disturbance  Past Medical History:     Past Medical History:   Diagnosis Date    Anxiety     [facility] ([facility] )       Past Surgical History:     History reviewed  No pertinent surgical history     Social History:        Social History     Socioeconomic History    Marital status: Single     Spouse name: None    Number of children: None    Years of education: None    Highest education level: None   Occupational History    Occupation: [employer]    Social Needs    Financial resource strain: Not hard at all   [place] insecurity     Worry: Never true     Inability: Never true   [employer] needs     Medical: No     Non-medical: None   Tobacco Use    Smoking status: Current Every Day Smoker     Packs/day: 1 00     Years: 30 00     Pack years: 30 00     Types: Cigarettes    Smokeless tobacco: Never Used   Substance and Sexual Activity    Alcohol use: Not Currently     Frequency: Never    Drug use: Not Currently     Types: Cocaine    Sexual activity: None   Lifestyle    Physical activity     Days per week: 4 days     Minutes per session: 40 min    Stress: Not at all   Relationships    Social connections     Talks on phone: Patient refused     Gets together: Patient refused     Attends Sikh service: Patient refused     Active member of club or organization: Patient refused     Attends meetings of clubs or organizations: Patient refused     Relationship status: Patient refused    Intimate partner violence     Fear of current or ex partner: No     Emotionally abused: No     Physically abused: No     Forced sexual activity: No   Other Topics Concern    None   Social History Narrative    Single     Lives with roommates     No caffeine use     Regular exercise       Family History:     Family History   Problem Relation Age of Onset    Stroke Family     Hypertension Family     Mental illness Family       Current Medications:     Current Outpatient Medications   Medication Sig Dispense Refill    escitalopram (LEXAPRO) 10 mg tablet Take 1 tablet (10 mg total) by mouth daily 90 tablet 1    escitalopram (LEXAPRO) 5 mg tablet Take 1 tablet (5 mg total) by mouth daily for total daily dose of 15 mg 90 tablet 1    hydrOXYzine HCL (ATARAX) 10 mg tablet Take 1 tablet (10 mg total) by mouth 2 (two) times a day as needed (anxiety/insomnia) 60 tablet 1    carbamide peroxide (DEBROX) 6 5 % otic solution Administer 5 drops into the left ear 2 (two) times a day 15 mL 0     No current facility-administered medications for this visit  Allergies:     No Known Allergies   Physical Exam:     /86 (BP Location: Left arm, Patient Position: Sitting, Cuff Size: Adult)   Pulse 72   Temp (!) 97 1 °F (36 2 °C) (Tympanic)   Resp 16   Ht 5' 10" (1 778 m)   Wt 78 4 kg (172 lb 12 8 oz)   SpO2 98%   BMI 24 79 kg/m²     Physical Exam  Constitutional:       General: He is not in acute distress  Appearance: He is well-developed  HENT:      Head: Normocephalic and atraumatic  Left Ear: There is impacted cerumen  Eyes:      General: No scleral icterus  Conjunctiva/sclera: Conjunctivae normal       Pupils: Pupils are equal, round, and reactive to light  Neck:      Musculoskeletal: Normal range of motion  Cardiovascular:      Rate and Rhythm: Normal rate and regular rhythm  Heart sounds: Normal heart sounds  No murmur  No friction rub  No gallop  Pulmonary:      Effort: Pulmonary effort is normal  No respiratory distress  Breath sounds: Normal breath sounds  No wheezing or rales  Abdominal:      General: Bowel sounds are normal  There is no distension  Palpations: Abdomen is soft  There is no mass  Tenderness: There is no abdominal tenderness  There is no guarding  Musculoskeletal:         General: No tenderness     Skin: General: Skin is warm and dry  Findings: No rash  Neurological:      Mental Status: He is alert and oriented to person, place, and time  Cranial Nerves: No cranial nerve deficit  Motor: No abnormal muscle tone            Brenna Vieira MD   [address] [place] [address]

## 2021-03-09 ENCOUNTER — TELEMEDICINE (OUTPATIENT)
Dept: PSYCHIATRY | Facility: CLINIC | Age: 56
End: 2021-03-09
Payer: COMMERCIAL

## 2021-03-09 DIAGNOSIS — F43.22 ADJUSTMENT DISORDER WITH ANXIOUS MOOD: ICD-10-CM

## 2021-03-09 DIAGNOSIS — F41.1 GAD (GENERALIZED ANXIETY DISORDER): ICD-10-CM

## 2021-03-09 PROCEDURE — 4004F PT TOBACCO SCREEN RCVD TLK: CPT | Performed by: NURSE PRACTITIONER

## 2021-03-09 PROCEDURE — 99214 OFFICE O/P EST MOD 30 MIN: CPT | Performed by: NURSE PRACTITIONER

## 2021-03-09 PROCEDURE — 90833 PSYTX W PT W E/M 30 MIN: CPT | Performed by: NURSE PRACTITIONER

## 2021-03-09 RX ORDER — ESCITALOPRAM OXALATE 10 MG/1
10 TABLET ORAL DAILY
Qty: 30 TABLET | Refills: 5 | Status: SHIPPED | OUTPATIENT
Start: 2021-03-09 | End: 2021-10-25

## 2021-03-09 RX ORDER — ESCITALOPRAM OXALATE 5 MG/1
5 TABLET ORAL DAILY
Qty: 30 TABLET | Refills: 5 | Status: SHIPPED | OUTPATIENT
Start: 2021-03-09 | End: 2021-10-11

## 2021-03-09 NOTE — PSYCH
Virtual Regular Visit    Problem List Items Addressed This Visit        Other    KAITLIN (generalized anxiety disorder)    Relevant Medications    escitalopram (LEXAPRO) 10 mg tablet    escitalopram (LEXAPRO) 5 mg tablet      Other Visit Diagnoses     Adjustment disorder with anxious mood        Relevant Medications    escitalopram (LEXAPRO) 10 mg tablet    escitalopram (LEXAPRO) 5 mg tablet             Encounter provider AUNDREA Narvaez    Provider located at   [facility] [address]  [place] [facility] [address] [phone]    Recent Visits  No visits were found meeting these conditions  Showing recent visits within past 7 days and meeting all other requirements     Today's Visits  Date Type Provider Dept   03/09/21 Telemedicine AUNDREA [facility]   Showing today's visits and meeting all other requirements     Future Appointments  No visits were found meeting these conditions  Showing future appointments within next 150 days and meeting all other requirements      The patient was identified by name and date of birth  Kirti Harmon was informed that this is a telemedicine visit and that the visit is being conducted through telephone  My office door was closed  No one else was in the room  He acknowledged consent and understanding of privacy and security of the video platform  The patient has agreed to participate and understands they can discontinue the visit at any time  Patient is aware this is a billable service       HPI     Current Outpatient Medications   Medication Sig Dispense Refill    carbamide peroxide (DEBROX) 6 5 % otic solution Administer 5 drops into the left ear 2 (two) times a day 15 mL 0    escitalopram (LEXAPRO) 10 mg tablet Take 1 tablet (10 mg total) by mouth daily 30 tablet 5    escitalopram (LEXAPRO) 5 mg tablet Take 1 tablet (5 mg total) by mouth daily for total daily dose of 15 mg 30 tablet 5    hydrOXYzine HCL (ATARAX) 10 mg tablet Take 1 tablet (10 mg total) by mouth 2 (two) times a day as needed (anxiety/insomnia) 60 tablet 1     No current facility-administered medications for this visit  Review of Systems  Video Exam    There were no vitals filed for this visit  Physical Exam   As a result of this visit, I have referred the patient for further respiratory evaluation  No    I spent 20 minutes directly with the patient during this visit  VIRTUAL VISIT DISCLAIMER    Duane Christ Gambler acknowledges that he has consented to an online visit or consultation  He understands that the online visit is based solely on information provided by him, and that, in the absence of a face-to-face physical evaluation by the physician, the diagnosis he receives is both limited and provisional in terms of accuracy and completeness  This is not intended to replace a full medical face-to-face evaluation by the physician  Duane Christ Gambler understands and accepts these terms  MEDICATION MANAGEMENT NOTE        [facility]    Name and Date of Birth:  Lizzeth Thakur 63 y o  1965 MRN: [id number]    Date of Visit: March 9, 2021    No Known Allergies  SUBJECTIVE:    Leland Call is seen today for a follow up for depression and anxiety  He reports that he has done well since the last visit  Patient dip depressive symptoms have remained in remission and anxiety is well controlled  States that he recently had a friend passed away and he feels that he dealt with this in healthy way  He talked to others and addressed his emotional response  Without resorting to drug or alcohol use  Patient plans to move in to apartment with a roommate on March 15th  Continues to be involved in AA/NA and plans to stay in contact with current room mates at group home  Patient states he is slightly concerned about steady weight loss    However patient is only eating 1 meal a day at while at work and is very active during working hours  He denies any side effects from medications  PLAN:  -Continue Lexapro 15 mg p o  daily; reordered as monthly refill as this is preferred by his insurance  - will continue to attend NA/AA and use coping skills as needed to maintain abstinence from drugs and alcohol  - follow-up in 4 months July 6th at 2:00 p m  in person  Aware of 24 hour and weekend coverage for urgent situations accessed by calling [facility] main practice number    Diagnoses and all orders for this visit:    Adjustment disorder with anxious mood  -     escitalopram (LEXAPRO) 10 mg tablet; Take 1 tablet (10 mg total) by mouth daily  -     escitalopram (LEXAPRO) 5 mg tablet; Take 1 tablet (5 mg total) by mouth daily for total daily dose of 15 mg    KAITLIN (generalized anxiety disorder)  -     escitalopram (LEXAPRO) 10 mg tablet; Take 1 tablet (10 mg total) by mouth daily  -     escitalopram (LEXAPRO) 5 mg tablet; Take 1 tablet (5 mg total) by mouth daily for total daily dose of 15 mg        Current Outpatient Medications on File Prior to Visit   Medication Sig Dispense Refill    carbamide peroxide (DEBROX) 6 5 % otic solution Administer 5 drops into the left ear 2 (two) times a day 15 mL 0    hydrOXYzine HCL (ATARAX) 10 mg tablet Take 1 tablet (10 mg total) by mouth 2 (two) times a day as needed (anxiety/insomnia) 60 tablet 1    [DISCONTINUED] escitalopram (LEXAPRO) 10 mg tablet Take 1 tablet (10 mg total) by mouth daily 90 tablet 1    [DISCONTINUED] escitalopram (LEXAPRO) 5 mg tablet Take 1 tablet (5 mg total) by mouth daily for total daily dose of 15 mg 90 tablet 1     No current facility-administered medications on file prior to visit  Psychotherapy Provided:     Individual psychotherapy provided: Yes  Counseling was provided during the session today for 16 minutes  Supportive counseling provided    Discussed healthy eating habits and daily caloric requirements to maintain weight with work intensive employment  Discussed coping mechanisms and plans to maintain sobriety after leaving group home  HPI ROS Appetite Changes and Sleep:     He reports normal sleep, decreased appetite, normal energy level   Patient denies suicidal or homicidal ideation    Review Of Systems:      General normal    Personality no change in personality   Constitutional negative   ENT negative   Cardiovascular negative   Respiratory negative   Gastrointestinal negative   Genitourinary negative   Musculoskeletal negative   Integumentary negative   Neurological negative   Endocrine negative   Other Symptoms none, all other systems are negative     Mental Status Evaluation:    Appearance   Unable to assess   Behavior calm and cooperative   Mood euthymic  Depression Scale -  of 10 (0 = No depression)  Anxiety Scale -  of 10 (0 = No anxiety)   Speech Normal rate and volume   Affect  unable to assess   Thought Processes Goal directed and coherent   Thought Content Does not verbalize delusional material   Associations Tightly connected   Perceptual Disturbances Denies hallucinations and does not appear to be responding to internal stimuli   Risk Potential Suicidal/Homicidal Ideation - No evidence of suicidal or homicidal ideation and Patient does not verbalize suicidal or homicidal ideation  Risk of Violence - No evidence of risk for violence found on assessment  Risk of Self Mutilation - No evidence of risk for self mutilation found on assessment   Orientation oriented to person, place, time/date and situation   Memory recent and remote memory grossly intact   Consciousness alert and awake   Attention/Concentration attention span and concentration are age appropriate   Insight fair   Judgement fair   Muscle Strength and Gait normal muscle strength and normal muscle tone, normal gait/station and normal balance   Motor Activity no abnormal movements   Language no difficulty naming common objects, no difficulty repeating a phrase, no difficulty writing a sentence   Fund of Knowledge adequate knowledge of current events  adequate fund of knowledge regarding past history  adequate fund of knowledge regarding vocabulary      Past Psychiatric History Update:     Inpatient Psychiatric Admission Since Last Encounter:   no  Changes to Outpatient Psychiatric Treatment Team:    no  Suicide Attempt Or Self Mutilation Since Last Encounter:   no  Incidence of Violent Behavior Since Last Encounter:   no    Traumatic History Update:     New Onset of Abuse Since Last Encounter:   no  Traumatic Events Since Last Encounter:   no    Past Medical History:    Past Medical History:   Diagnosis Date    Anxiety     Substance abuse ([facility] [address] )      No past medical history pertinent negatives  No past surgical history on file    No Known Allergies  Substance Abuse History:    Social History     Substance and Sexual Activity   Alcohol Use Not Currently    Frequency: Never     Social History     Substance and Sexual Activity   Drug Use Not Currently    Types: Cocaine     Social History:    Social History     Socioeconomic History    Marital status: Single     Spouse name: Not on file    Number of children: Not on file    Years of education: Not on file    Highest education level: Not on file   Occupational History    Occupation: [employer]    Social Needs    Financial resource strain: Not hard at all   Alpha-Eliseo insecurity     Worry: Never true     Inability: Never true    Transportation needs     Medical: No     Non-medical: Not on file   Tobacco Use    Smoking status: Current Every Day Smoker     Packs/day: 1 00     Years: 30 00     Pack years: 30 00     Types: Cigarettes    Smokeless tobacco: Never Used   Substance and Sexual Activity    Alcohol use: Not Currently     Frequency: Never    Drug use: Not Currently     Types: Cocaine    Sexual activity: Not on file   Lifestyle    Physical activity     Days per week: 4 days Minutes per session: 40 min    Stress: Not at all   Relationships    Social connections     Talks on phone: Patient refused     Gets together: Patient refused     Attends Pentecostal service: Patient refused     Active member of club or organization: Patient refused     Attends meetings of clubs or organizations: Patient refused     Relationship status: Patient refused    Intimate partner violence     Fear of current or ex partner: No     Emotionally abused: No     Physically abused: No     Forced sexual activity: No   Other Topics Concern    Not on file   Social History Narrative    Single     Lives with roommates     No caffeine use     Regular exercise      Family Psychiatric History:     Family History   Problem Relation Age of Onset    Stroke Family     Hypertension Family     Mental illness Family      History Review: The following portions of the patient's history were reviewed and updated as appropriate: allergies, current medications, past family history, past medical history, past social history, past surgical history and problem list     OBJECTIVE:     Vital signs in last 24 hours: There were no vitals filed for this visit  Laboratory Results: I have personally reviewed all pertinent laboratory/tests results  Suicide/Homicide Risk Assessment:    Risk of Harm to Self:   The following ratings are based on assessment at the time of the interview   Recent Specific Risk Factors include: none    Risk of Harm to Others:   The following ratings are based on assessment at the time of the interview   Recent Specific Risk Factors include: none  The following interventions are recommended: no intervention changes needed    Medications Risks/Benefits:      Risks, Benefits And Possible Side Effects Of Medications:    Discussed risks and benefits of treatment with patient including risk of suicidality, serotonin syndrome and SIADH related to treatment with antidepressants;  Risk of induction of manic symptoms in certain patient populations     Controlled Medication Discussion:     Not applicable    Treatment Plan:    Due for update/Updated:   AUNDREA Bradley 03/09/21    This note was shared with patient

## 2021-06-01 ENCOUNTER — TELEPHONE (OUTPATIENT)
Dept: PSYCHIATRY | Facility: CLINIC | Age: 56
End: 2021-06-01

## 2021-06-01 NOTE — TELEPHONE ENCOUNTER
Called and spoke with Duane to reschedule his apt  From 7/6 with Shawn Duane is now scheduled for 8/2

## 2021-08-02 ENCOUNTER — OFFICE VISIT (OUTPATIENT)
Dept: PSYCHIATRY | Facility: CLINIC | Age: 56
End: 2021-08-02
Payer: COMMERCIAL

## 2021-08-02 VITALS
SYSTOLIC BLOOD PRESSURE: 129 MMHG | WEIGHT: 175 LBS | HEART RATE: 73 BPM | DIASTOLIC BLOOD PRESSURE: 80 MMHG | BODY MASS INDEX: 25.11 KG/M2

## 2021-08-02 DIAGNOSIS — F41.1 GAD (GENERALIZED ANXIETY DISORDER): Primary | ICD-10-CM

## 2021-08-02 DIAGNOSIS — R41.840 ATTENTION DEFICIT: ICD-10-CM

## 2021-08-02 PROCEDURE — 99214 OFFICE O/P EST MOD 30 MIN: CPT | Performed by: NURSE PRACTITIONER

## 2021-08-02 RX ORDER — BUPROPION HYDROCHLORIDE 150 MG/1
150 TABLET ORAL EVERY MORNING
Qty: 7 TABLET | Refills: 0 | Status: SHIPPED | OUTPATIENT
Start: 2021-08-02 | End: 2021-10-11

## 2021-08-02 RX ORDER — BUPROPION HYDROCHLORIDE 300 MG/1
300 TABLET ORAL EVERY MORNING
Qty: 30 TABLET | Refills: 2 | Status: SHIPPED | OUTPATIENT
Start: 2021-08-02 | End: 2021-11-28 | Stop reason: SDUPTHER

## 2021-08-02 NOTE — PSYCH
MEDICATION MANAGEMENT NOTE        [facility]    Name and Date of Birth:  Pamela Selby 61 y o  1965 MRN: [id number]    Date of Visit: August 2, 2021    No Known Allergies  SUBJECTIVE:    Yesenia Schulte is seen today for a follow up for Generalized Anxiety Disorder and attention deficit  He reports that he has done well since the last visit  Patient states he continues to do well overall  He has been able to maintain employment and stable housing  Patient has stayed sober  Is motivating and exercises regularly  States the only thing that he struggles with is concentration  Patient does jump between topics frequently during conversation with writer and states this has a negative impact his functioning  Will initiate Wellbutrin and consider tapering and discontinuing Lexapro if he does well on Wellbutrin  He denies any side effects from medications  PLAN:  -  Initiate Wellbutrin xl 150 mg p o  daily for 7 days then increase to Wellbutrin xl 300 mg p o  daily thereafter  - continue Lexapro 15 mg p o  daily  If patient does well on Wellbutrin -consider tapering and discontinuing Lexapro as patient has been in continuous remission for over 1 year   patient declines individual therapy at this time   -follow-up in 2 months  Aware of 24 hour and weekend coverage for urgent situations accessed by calling [facility] main practice number    Diagnoses and all orders for this visit:    KAITLIN (generalized anxiety disorder)    Attention deficit  -     buPROPion (WELLBUTRIN XL) 150 mg 24 hr tablet; Take 1 tablet (150 mg total) by mouth every morning  -     buPROPion (WELLBUTRIN XL) 300 mg 24 hr tablet;  Take 1 tablet (300 mg total) by mouth every morning        Current Outpatient Medications on File Prior to Visit   Medication Sig Dispense Refill    carbamide peroxide (DEBROX) 6 5 % otic solution Administer 5 drops into the left ear 2 (two) times a day 15 mL 0    escitalopram (LEXAPRO) 10 mg tablet Take 1 tablet (10 mg total) by mouth daily 30 tablet 5    escitalopram (LEXAPRO) 5 mg tablet Take 1 tablet (5 mg total) by mouth daily for total daily dose of 15 mg 30 tablet 5    hydrOXYzine HCL (ATARAX) 10 mg tablet Take 1 tablet (10 mg total) by mouth 2 (two) times a day as needed (anxiety/insomnia) 60 tablet 1     No current facility-administered medications on file prior to visit  Psychotherapy Provided:     Individual psychotherapy provided: Yes  Counseling was provided during the session today for 16 minutes  Supportive counseling provided  HPI ROS Appetite Changes and Sleep:     He reports normal sleep, normal appetite, normal energy level   Patient denies suicidal or homicidal ideation    Review Of Systems:      General normal    Personality no change in personality   Constitutional negative   ENT negative   Cardiovascular negative   Respiratory negative   Gastrointestinal negative   Genitourinary negative   Musculoskeletal negative   Integumentary negative   Neurological negative   Endocrine negative   Other Symptoms none, all other systems are negative     Mental Status Evaluation:    Appearance Adequate hygiene and grooming   Behavior cooperative   Mood euthymic  Depression Scale -  of 10 (0 = No depression)  Anxiety Scale -  of 10 (0 = No anxiety)   Speech Normal rate and volume   Affect appropriate and mood-congruent   Thought Processes Goal directed and coherent   Thought Content Does not verbalize delusional material   Associations Tightly connected   Perceptual Disturbances Denies hallucinations and does not appear to be responding to internal stimuli   Risk Potential Suicidal/Homicidal Ideation - No evidence of suicidal or homicidal ideation and patient does not verbalize suicidal or homicidal ideation  Risk of Violence - No evidence of risk for violence found on assessment  Risk of Self Mutilation - No evidence of risk for self mutilation found on assessment   Orientation oriented to person, place, time/date and situation   Memory recent and remote memory grossly intact   Consciousness alert and awake   Attention/Concentration poor attention span   Insight intact   Judgement intact   Muscle Strength and Gait normal muscle strength and normal muscle tone, normal gait/station and normal balance   Motor Activity no abnormal movements   Language no difficulty naming common objects, no difficulty repeating a phrase, no difficulty writing a sentence   Fund of Knowledge adequate knowledge of current events  adequate fund of knowledge regarding past history  adequate fund of knowledge regarding vocabulary      Past Psychiatric History Update:     Inpatient Psychiatric Admission Since Last Encounter:   no  Changes to Outpatient Psychiatric Treatment Team:    no  Suicide Attempt Or Self Mutilation Since Last Encounter:   no  Incidence of Violent Behavior Since Last Encounter:   no    Traumatic History Update:     New Onset of Abuse Since Last Encounter:   no  Traumatic Events Since Last Encounter:   no    Past Medical History:    Past Medical History:   Diagnosis Date    Anxiety     [facility]      No past medical history pertinent negatives  No past surgical history on file    No Known Allergies  Substance Abuse History:    Social History     Substance and Sexual Activity   Alcohol Use Not Currently     Social History     Substance and Sexual Activity   Drug Use Not Currently    Types: Cocaine     Social History:    Social History     Socioeconomic History    Marital status: Single     Spouse name: Not on file    Number of children: Not on file    Years of education: Not on file    Highest education level: Not on file   Occupational History    Occupation: [employer]    Tobacco Use    Smoking status: Current Every Day Smoker     Packs/day: 1 00     Years: 30 00     Pack years: 30 00     Types: Cigarettes    Smokeless tobacco: Never Used   Substance and Sexual Activity    Alcohol use: Not Currently    Drug use: Not Currently     Types: Cocaine    Sexual activity: Not on file   Other Topics Concern    Not on file   Social History Narrative    Single     Lives with roommates     No caffeine use     Regular exercise      Social Determinants of Health     Financial Resource Strain: Low Risk     Difficulty of Paying Living Expenses: Not hard at all   Food Insecurity: No Food Insecurity    Worried About Running Out of Food in the Last Year: Never true    Margaret of Food in the Last Year: Never true   Transportation Needs: Unknown    Lack of Transportation (Medical): No    Lack of Transportation (Non-Medical): Not on file   Physical Activity: Sufficiently Active    Days of Exercise per Week: 4 days    Minutes of Exercise per Session: 40 min   Stress: No Stress Concern Present    Feeling of Stress : Not at all   Social Connections: Unknown    Frequency of Communication with Friends and Family: Patient refused    Frequency of Social Gatherings with Friends and Family: Patient refused    Attends Shinto Services: Patient refused    Active Member of Clubs or Organizations: Patient refused    Attends Club or Organization Meetings: Patient refused    Marital Status: Patient refused   Intimate Partner Violence: Not At Risk    Fear of Current or Ex-Partner: No    Emotionally Abused: No    Physically Abused: No    Sexually Abused: No     Family Psychiatric History:     Family History   Problem Relation Age of Onset    Stroke Family     Hypertension Family     Mental illness Family      History Review: The following portions of the patient's history were reviewed and updated as appropriate: allergies, current medications, past family history, past medical history, past social history, past surgical history and problem list     OBJECTIVE:     Vital signs in last 24 hours:     There were no vitals filed for this visit  Laboratory Results: I have personally reviewed all pertinent laboratory/tests results  Suicide/Homicide Risk Assessment:    Risk of Harm to Self:   The following ratings are based on assessment at the time of the interview   Recent Specific Risk Factors include: none    Risk of Harm to Others:   The following ratings are based on assessment at the time of the interview   Recent Specific Risk Factors include: none  The following interventions are recommended: no intervention changes needed    Medications Risks/Benefits:      Risks, Benefits And Possible Side Effects Of Medications:    Discussed risks and benefits of treatment with patient including risk of suicidality, serotonin syndrome and SIADH related to treatment with antidepressants; Risk of induction of manic symptoms in certain patient populations and Reduction in seizure threshold from Wellbutrin     Controlled Medication Discussion:     Arpan García has been filling controlled prescriptions on time as prescribed according to [facility]    Treatment Plan:    Due for update/Updated:   yes    AUNDREA Howard 08/02/21    This note was shared with patient

## 2021-08-02 NOTE — BH TREATMENT PLAN
TREATMENT PLAN (Medication Management Only)        [facility]    Name and Date of Birth:  Yolie Keating 61 y o  1965  Date of Treatment Plan: August 2, 2021  Diagnosis/Diagnoses:    1  KAITLIN (generalized anxiety disorder)    2  Attention deficit      Strengths/Personal Resources for Self-Care: motivation for treatment, taking medications as prescribed, ability to communicate well, ability to listen, ability to reason  Area/Areas of need (in own words): depressive symptoms, anxiety symptoms, attention and concentration problems  1  Long Term Goal: maintain improvement in anxiety, maintain improvement in depression and improve attention and concentration  Target Date: 6 months - 2/2/2022  Person/Persons responsible for completion of goal: Duane E Grant  2  Short Term Objective (s) - How will we reach this goal?:   A  Provider new recommended medication/dosage changes and/or continue medication(s): continue current medications as prescribed  B   Attend medication management appointments regularly  C   N/A  Target Date: 6 months - 2/2/2022  Person/Persons Responsible for Completion of Goal: Duane E Grant  Progress Towards Goals: continuing treatment  Treatment Modality: medication management with psychotherapy every 8 weeks  Review due 90 to 120 days from date of this plan: 6 months - 2/2/2022  Expected length of service: maintenance unless revised  My Physician/PA/NP and I have developed this plan together and I agree to work on the goals and objectives  I understand the treatment goals that were developed for my treatment    Treatment Plan Verbal Consent - Due to COVID
Parent(s)

## 2021-08-13 NOTE — PROGRESS NOTES
BMI Counseling: Body mass index is 24 79 kg/m²  The BMI is above normal  Nutrition recommendations include reducing portion sizes, decreasing overall calorie intake and 3-5 servings of fruits/vegetables daily  Exercise recommendations include exercising 3-5 times per week

## 2021-10-11 ENCOUNTER — OFFICE VISIT (OUTPATIENT)
Dept: PSYCHIATRY | Facility: CLINIC | Age: 56
End: 2021-10-11
Payer: COMMERCIAL

## 2021-10-11 DIAGNOSIS — R41.840 ATTENTION DEFICIT: ICD-10-CM

## 2021-10-11 DIAGNOSIS — F41.1 GAD (GENERALIZED ANXIETY DISORDER): Primary | ICD-10-CM

## 2021-10-11 PROCEDURE — 99214 OFFICE O/P EST MOD 30 MIN: CPT | Performed by: NURSE PRACTITIONER

## 2021-10-24 DIAGNOSIS — F41.1 GAD (GENERALIZED ANXIETY DISORDER): ICD-10-CM

## 2021-10-24 DIAGNOSIS — F43.22 ADJUSTMENT DISORDER WITH ANXIOUS MOOD: ICD-10-CM

## 2021-10-25 RX ORDER — ESCITALOPRAM OXALATE 10 MG/1
TABLET ORAL
Qty: 30 TABLET | Refills: 5 | Status: SHIPPED | OUTPATIENT
Start: 2021-10-25 | End: 2022-02-08 | Stop reason: SDUPTHER

## 2021-11-28 ENCOUNTER — TELEPHONE (OUTPATIENT)
Dept: OTHER | Facility: OTHER | Age: 56
End: 2021-11-28

## 2021-11-28 DIAGNOSIS — R41.840 ATTENTION DEFICIT: ICD-10-CM

## 2021-11-28 RX ORDER — BUPROPION HYDROCHLORIDE 300 MG/1
300 TABLET ORAL EVERY MORNING
Qty: 30 TABLET | Refills: 0 | Status: SHIPPED | OUTPATIENT
Start: 2021-11-28 | End: 2022-01-04

## 2022-02-08 DIAGNOSIS — F41.9 ANXIETY: ICD-10-CM

## 2022-02-08 DIAGNOSIS — F41.1 GAD (GENERALIZED ANXIETY DISORDER): ICD-10-CM

## 2022-02-08 DIAGNOSIS — R41.840 ATTENTION DEFICIT: ICD-10-CM

## 2022-02-08 DIAGNOSIS — F43.22 ADJUSTMENT DISORDER WITH ANXIOUS MOOD: ICD-10-CM

## 2022-02-08 RX ORDER — BUPROPION HYDROCHLORIDE 300 MG/1
300 TABLET ORAL EVERY MORNING
Qty: 30 TABLET | Refills: 5 | Status: SHIPPED | OUTPATIENT
Start: 2022-02-08 | End: 2022-08-08

## 2022-02-08 RX ORDER — HYDROXYZINE HYDROCHLORIDE 10 MG/1
10 TABLET, FILM COATED ORAL 2 TIMES DAILY PRN
Qty: 60 TABLET | Refills: 1 | Status: SHIPPED | OUTPATIENT
Start: 2022-02-08

## 2022-02-08 RX ORDER — ESCITALOPRAM OXALATE 10 MG/1
10 TABLET ORAL DAILY
Qty: 30 TABLET | Refills: 5 | Status: SHIPPED | OUTPATIENT
Start: 2022-02-08 | End: 2022-08-08

## 2022-02-08 NOTE — TELEPHONE ENCOUNTER
Duane called to get his medication sent to the [facility] on [address] in [facility], [place]  Duane's insurance is no longer covering his medication at [facility]  Patient will need his medication prescribed for 90 days       Next appointment is on 3/1

## 2022-03-01 ENCOUNTER — OFFICE VISIT (OUTPATIENT)
Dept: PSYCHIATRY | Facility: CLINIC | Age: 57
End: 2022-03-01
Payer: COMMERCIAL

## 2022-03-01 DIAGNOSIS — F43.22 ADJUSTMENT DISORDER WITH ANXIOUS MOOD: ICD-10-CM

## 2022-03-01 DIAGNOSIS — F41.1 GAD (GENERALIZED ANXIETY DISORDER): Primary | ICD-10-CM

## 2022-03-01 DIAGNOSIS — R41.840 ATTENTION DEFICIT: ICD-10-CM

## 2022-03-01 PROCEDURE — 99213 OFFICE O/P EST LOW 20 MIN: CPT | Performed by: NURSE PRACTITIONER

## 2022-03-01 NOTE — BH TREATMENT PLAN
TREATMENT PLAN (Medication Management Only)        [facility]    Name and Date of Birth:  Ilene Looney 61 y o  1965  Date of Treatment Plan: March 1, 2022  Diagnosis/Diagnoses:    1  KAITLIN (generalized anxiety disorder)    2  Adjustment disorder with anxious mood    3  Attention deficit      Strengths/Personal Resources for Self-Care: motivation for treatment, taking medications as prescribed, ability to communicate well, ability to listen, ability to reason  Area/Areas of need (in own words): depressive symptoms, anxiety symptoms  1  Long Term Goal: maintain improvement in anxiety and maintain improvement in depression  Target Date: 6 months - 9/1/2022  Person/Persons responsible for completion of goal: Duane E Grant  2  Short Term Objective (s) - How will we reach this goal?:   A  Provider new recommended medication/dosage changes and/or continue medication(s): continue current medications as prescribed  B   Attend medication management appointments regularly  C  Avoid alcohol   Target Date: 6 months - 9/1/2022  Person/Persons Responsible for Completion of Goal: Duane E Grant  Progress Towards Goals: continuing treatment  Treatment Modality: medication management with psychotherapy every 6 months  Review due 90 to 120 days from date of this plan: 6 months - 9/1/2022  Expected length of service: maintenance unless revised  My Physician/PA/NP and I have developed this plan together and I agree to work on the goals and objectives  I understand the treatment goals that were developed for my treatment    Treatment Plan Verbal Consent - Due to COVID

## 2022-03-01 NOTE — PSYCH
MEDICATION MANAGEMENT NOTE        [facility]    Name and Date of Birth:  Terri Ruiz 61 y o  1965 MRN: [id number]    Date of Visit: March 1, 2022    No Known Allergies  SUBJECTIVE:    Jtgali Douglas is seen today for a follow up for Generalized Anxiety Disorder and ADHD  He reports that he has done well since the last visit  Patient reports that despite some minor bumps in the road Nils Igor has been doing well  Current employer is shutting down but is eligible transfer to [employer] company  Has had some difficulty with roommate but has been able to cope with anger and frustration  Is forgetful at times especially when anxious or flustered  However, has not noticed any change in forgetfulness  No increase in anxiety with decrease in Lexapro  He denies any side effects from medications  PLAN:  Continue Lexapro 10 mg p o  Daily  Continue Wellbutrin  mg p o  Daily  Hydroxyzine 10 mg p o  B i d  P r n  Follow-up in 6 months  Aware of 24 hour and weekend coverage for urgent situations accessed by calling [facility] main practice number    There are no diagnoses linked to this encounter  Current Outpatient Medications on File Prior to Visit   Medication Sig Dispense Refill    buPROPion (WELLBUTRIN XL) 300 mg 24 hr tablet Take 1 tablet (300 mg total) by mouth every morning 30 tablet 5    carbamide peroxide (DEBROX) 6 5 % otic solution Administer 5 drops into the left ear 2 (two) times a day 15 mL 0    escitalopram (LEXAPRO) 10 mg tablet Take 1 tablet (10 mg total) by mouth daily 30 tablet 5    hydrOXYzine HCL (ATARAX) 10 mg tablet Take 1 tablet (10 mg total) by mouth 2 (two) times a day as needed (anxiety/insomnia) 60 tablet 1     No current facility-administered medications on file prior to visit          Psychotherapy Provided:     Individual psychotherapy provided: Yes  Counseling was provided during the session today for 16 minutes  Supportive counseling provided  HPI ROS Appetite Changes and Sleep:     He reports normal sleep, normal appetite, normal energy level   Denies homicidal ideation, denies suicidal ideation    Review Of Systems:      General emotional problems   Personality no change in personality   Constitutional negative   ENT negative   Cardiovascular negative   Respiratory negative   Gastrointestinal negative   Genitourinary negative   Musculoskeletal negative   Integumentary negative   Neurological negative   Endocrine negative   Other Symptoms none, all other systems are negative     Mental Status Evaluation:    Appearance Adequate hygiene and grooming   Behavior calm and cooperative   Mood anxious  Depression Scale -  of 10 (0 = No depression)  Anxiety Scale -  of 10 (0 = No anxiety)   Speech Normal rate and volume   Affect appropriate and mood-congruent   Thought Processes Goal directed and coherent   Thought Content Does not verbalize delusional material   Associations Tightly connected   Perceptual Disturbances Denies hallucinations and does not appear to be responding to internal stimuli   Risk Potential Suicidal/Homicidal Ideation - No evidence of suicidal or homicidal ideation and patient does not verbalize suicidal or homicidal ideation  Risk of Violence - No evidence of risk for violence found on assessment  Risk of Self Mutilation - No evidence of risk for self mutilation found on assessment   Orientation oriented to person, place, time/date and situation   Memory recent and remote memory grossly intact   Consciousness alert and awake   Attention/Concentration attention span and concentration are age appropriate   Insight intact   Judgement Fair   Muscle Strength and Gait normal muscle strength and normal muscle tone, normal gait/station and normal balance   Motor Activity no abnormal movements   Language no difficulty naming common objects, no difficulty repeating a phrase, no difficulty writing a sentence   Fund of Knowledge adequate knowledge of current events  adequate fund of knowledge regarding past history  adequate fund of knowledge regarding vocabulary      Past Psychiatric History Update:     Inpatient Psychiatric Admission Since Last Encounter:   no  Changes to Outpatient Psychiatric Treatment Team:    no  Suicide Attempt Or Self Mutilation Since Last Encounter:   no  Incidence of Violent Behavior Since Last Encounter:   no    Traumatic History Update:     New Onset of Abuse Since Last Encounter:   no  Traumatic Events Since Last Encounter:   no    Past Medical History:    Past Medical History:   Diagnosis Date    Anxiety     Substance abuse ([facility] [address] )      No past medical history pertinent negatives  No past surgical history on file    No Known Allergies  Substance Abuse History:    Social History     Substance and Sexual Activity   Alcohol Use Not Currently     Social History     Substance and Sexual Activity   Drug Use Not Currently    Types: Cocaine     Social History:    Social History     Socioeconomic History    Marital status: Single     Spouse name: Not on file    Number of children: Not on file    Years of education: Not on file    Highest education level: Not on file   Occupational History    Occupation: [employer]    Tobacco Use    Smoking status: Current Every Day Smoker     Packs/day: 1 00     Years: 30 00     Pack years: 30 00     Types: Cigarettes    Smokeless tobacco: Never Used   Substance and Sexual Activity    Alcohol use: Not Currently    Drug use: Not Currently     Types: Cocaine    Sexual activity: Not on file   Other Topics Concern    Not on file   Social History Narrative    Single     Lives with roommates     No caffeine use     Regular exercise      Social Determinants of Health     Financial Resource Strain: Not on file   Food Insecurity: Not on file   Transportation Needs: Not on file   Physical Activity: Not on file   Stress: Not on file   Social Connections: Not on file   Intimate Partner Violence: Not on file   Housing Stability: Not on file     Family Psychiatric History:     Family History   Problem Relation Age of Onset    Stroke Family     Hypertension Family     Mental illness Family      History Review: The following portions of the patient's history were reviewed and updated as appropriate: allergies, current medications, past family history, past medical history, past social history, past surgical history and problem list     OBJECTIVE:     Vital signs in last 24 hours: There were no vitals filed for this visit  Laboratory Results: I have personally reviewed all pertinent laboratory/tests results  Suicide/Homicide Risk Assessment:    Risk of Harm to Self:   The following ratings are based on assessment at the time of the interview   Recent Specific Risk Factors include: current anxiety symptoms    Risk of Harm to Others:   The following ratings are based on assessment at the time of the interview   Recent Specific Risk Factors include: none  The following interventions are recommended: no intervention changes needed    Medications Risks/Benefits:      Risks, Benefits And Possible Side Effects Of Medications:    Discussed risks and benefits of treatment with patient including risk of suicidality, serotonin syndrome, increased QTc interval and SIADH related to treatment with antidepressants; Risk of induction of manic symptoms in certain patient populations and Reduction in seizure threshold related to treatment with bupropion      Controlled Medication Discussion:     Not applicable    Treatment Plan:    Due for update/Updated:   yes    AUNDREA Salguero 03/01/22    This note was shared with patient

## 2022-03-07 ENCOUNTER — OFFICE VISIT (OUTPATIENT)
Dept: FAMILY MEDICINE CLINIC | Facility: CLINIC | Age: 57
End: 2022-03-07
Payer: COMMERCIAL

## 2022-03-07 VITALS
BODY MASS INDEX: 26.45 KG/M2 | OXYGEN SATURATION: 98 % | TEMPERATURE: 98.2 F | HEART RATE: 78 BPM | WEIGHT: 184.8 LBS | DIASTOLIC BLOOD PRESSURE: 80 MMHG | RESPIRATION RATE: 16 BRPM | HEIGHT: 70 IN | SYSTOLIC BLOOD PRESSURE: 138 MMHG

## 2022-03-07 DIAGNOSIS — N52.9 ERECTILE DYSFUNCTION, UNSPECIFIED ERECTILE DYSFUNCTION TYPE: ICD-10-CM

## 2022-03-07 DIAGNOSIS — Z72.0 TOBACCO USER: ICD-10-CM

## 2022-03-07 DIAGNOSIS — Z13.1 SCREENING FOR DIABETES MELLITUS (DM): ICD-10-CM

## 2022-03-07 DIAGNOSIS — Z12.11 SCREENING FOR COLON CANCER: ICD-10-CM

## 2022-03-07 DIAGNOSIS — Z00.00 ANNUAL PHYSICAL EXAM: Primary | ICD-10-CM

## 2022-03-07 DIAGNOSIS — Z12.5 PROSTATE CANCER SCREENING: ICD-10-CM

## 2022-03-07 DIAGNOSIS — Z11.4 SCREENING FOR HIV (HUMAN IMMUNODEFICIENCY VIRUS): ICD-10-CM

## 2022-03-07 DIAGNOSIS — Z13.220 SCREENING FOR HYPERLIPIDEMIA: ICD-10-CM

## 2022-03-07 DIAGNOSIS — Z13.0 SCREENING FOR DEFICIENCY ANEMIA: ICD-10-CM

## 2022-03-07 DIAGNOSIS — H61.22 IMPACTED CERUMEN OF LEFT EAR: ICD-10-CM

## 2022-03-07 DIAGNOSIS — Z11.59 NEED FOR HEPATITIS C SCREENING TEST: ICD-10-CM

## 2022-03-07 PROCEDURE — 4004F PT TOBACCO SCREEN RCVD TLK: CPT | Performed by: FAMILY MEDICINE

## 2022-03-07 PROCEDURE — 3008F BODY MASS INDEX DOCD: CPT | Performed by: FAMILY MEDICINE

## 2022-03-07 PROCEDURE — 3725F SCREEN DEPRESSION PERFORMED: CPT | Performed by: FAMILY MEDICINE

## 2022-03-07 PROCEDURE — 99213 OFFICE O/P EST LOW 20 MIN: CPT | Performed by: FAMILY MEDICINE

## 2022-03-07 PROCEDURE — 69210 REMOVE IMPACTED EAR WAX UNI: CPT | Performed by: FAMILY MEDICINE

## 2022-03-07 PROCEDURE — 99396 PREV VISIT EST AGE 40-64: CPT | Performed by: FAMILY MEDICINE

## 2022-03-07 RX ORDER — SILDENAFIL 25 MG/1
25 TABLET, FILM COATED ORAL DAILY PRN
Qty: 10 TABLET | Refills: 0 | Status: SHIPPED | OUTPATIENT
Start: 2022-03-07

## 2022-03-07 NOTE — PROGRESS NOTES
Assessment/Plan:    Annual physical exam  Pt has never had a colonoscopy  Discussed and order placed today  PSA screening discussed and accepted today  Pt is a smoker with a 30PY  The uspstf recommends annual low dose CT to screen for lung cancer is otherwise healthy adults 55-80 with a 30 py and who currently smoke or have quit within the last 15 years  The center for Medicare & Medicaid Services pays up to age 68  The AAFP does not indorse this as the evidence is insufficient  In the National Lung screening Trial, 3/1000 people screened annually for 3 yrs and followed for 5 years avoided death  Discussed the risks and benefits with the pt  Harms include false positives, overdiagnosis, radiation exposure(a total of 95% abnormal results are FP, many cancers are indolent and will not lead to death if left alone)  He declines screening today  Tobacco user  Tobacco Cessation Counseling: Tobacco cessation counseling and education was provided  The patient is sincerely urged to quit consumption of tobacco  He is not ready to quit tobacco  The numerous health risks of tobacco consumption were discussed  If he decides to quit, there are a number of helpful adjunctive aids, and he can see me to discuss nicotine replacement therapy, chantix, or bupropion anytime in the future  Diagnoses and all orders for this visit:    Annual physical exam    Screening for colon cancer  -     Ambulatory Referral to Gastroenterology; Future    Screening for diabetes mellitus (DM)  -     Comprehensive metabolic panel; Future    Screening for deficiency anemia  -     CBC and Platelet; Future    Screening for hyperlipidemia  -     Lipid panel; Future    Screening for HIV (human immunodeficiency virus)  -     HIV 1/2 Antigen/Antibody (4th Generation) w Reflex [facility]; Future    Need for hepatitis C screening test  -     Hepatitis C antibody;  Future    BMI 26 0-26 9,adult    Tobacco user    Prostate cancer screening  - PSA, Total Screen; Future    Erectile dysfunction, unspecified erectile dysfunction type  -     sildenafil (VIAGRA) 25 MG tablet; Take 1 tablet (25 mg total) by mouth daily as needed for erectile dysfunction    Other orders  -     Ear cerumen removal          Subjective:  Ear cerumen impaction     Patient ID: Scott Liz is a 61 y o  male  HPI  Left ear TM was impacted with cerumen  The following portions of the patient's history were reviewed and updated as appropriate: allergies, current medications, past family history, past medical history, past social history, past surgical history and problem list     Review of Systems   HENT: Negative for ear discharge and ear pain  PHQ-2/9 Depression Screening    Little interest or pleasure in doing things: 0 - not at all  Feeling down, depressed, or hopeless: 0 - not at all  PHQ-2 Score: 0  PHQ-2 Interpretation: Negative depression screen           Objective:      /80 (BP Location: Left arm, Patient Position: Sitting, Cuff Size: Adult)   Pulse 78   Temp 98 2 °F (36 8 °C) (Tympanic)   Resp 16   Ht 5' 10" (1 778 m)   Wt 83 8 kg (184 lb 12 8 oz)   SpO2 98%   BMI 26 52 kg/m²            Physical Exam  Constitutional:       General: He is not in acute distress  Appearance: He is well-developed  He is not diaphoretic  HENT:      Head: Normocephalic and atraumatic  Right Ear: External ear normal  There is no impacted cerumen  Left Ear: External ear normal  There is impacted cerumen  Nose: Nose normal    Eyes:      Conjunctiva/sclera: Conjunctivae normal       Pupils: Pupils are equal, round, and reactive to light  Pulmonary:      Effort: Pulmonary effort is normal  No respiratory distress  Skin:     Findings: No rash  Neurological:      Mental Status: He is alert and oriented to person, place, and time     Psychiatric:         Behavior: Behavior normal        Ear cerumen removal    Date/Time: 3/7/2022 12:42 PM  Performed by: Teresa Day MD  Authorized by: Teresa Day MD   Universal Protocol:  Consent given by: patient  Patient identity confirmed: verbally with patient      Patient location:  Clinic  Procedure details:     Location:  L ear    Procedure type: curette    Post-procedure details:     Complication:  None    Hearing quality:  Normal    Patient tolerance of procedure:   Tolerated well, no immediate complications  Comments:      Large amount of cerumen removed

## 2022-03-07 NOTE — ASSESSMENT & PLAN NOTE
Pt has never had a colonoscopy  Discussed and order placed today  PSA screening discussed and accepted today  Pt is a smoker with a 30PY  The uspstf recommends annual low dose CT to screen for lung cancer is otherwise healthy adults 55-80 with a 30 py and who currently smoke or have quit within the last 15 years  The center for Medicare & Medicaid Services pays up to age 68  The AAFP does not indorse this as the evidence is insufficient  In the National Lung screening Trial, 3/1000 people screened annually for 3 yrs and followed for 5 years avoided death  Discussed the risks and benefits with the pt  Harms include false positives, overdiagnosis, radiation exposure(a total of 95% abnormal results are FP, many cancers are indolent and will not lead to death if left alone)  He declines screening today

## 2022-03-07 NOTE — PATIENT INSTRUCTIONS

## 2022-03-07 NOTE — PROGRESS NOTES
ADULT ANNUAL PHYSICAL  [facility]    NAME: Jelly Gee  AGE: 61 y o  SEX: male  : 1965     DATE: 3/7/2022     Assessment and Plan:     Problem List Items Addressed This Visit        Other    Tobacco user     Tobacco Cessation Counseling: Tobacco cessation counseling and education was provided  The patient is sincerely urged to quit consumption of tobacco  He is not ready to quit tobacco  The numerous health risks of tobacco consumption were discussed  If he decides to quit, there are a number of helpful adjunctive aids, and he can see me to discuss nicotine replacement therapy, chantix, or bupropion anytime in the future  Annual physical exam - Primary     Pt has never had a colonoscopy  Discussed and order placed today  PSA screening discussed and accepted today  Pt is a smoker with a 30PY  The uspstf recommends annual low dose CT to screen for lung cancer is otherwise healthy adults 55-80 with a 30 py and who currently smoke or have quit within the last 15 years  The center for Medicare & Medicaid Services pays up to age 68  The AAFP does not indorse this as the evidence is insufficient  In the National Lung screening Trial, 3/1000 people screened annually for 3 yrs and followed for 5 years avoided death  Discussed the risks and benefits with the pt  Harms include false positives, overdiagnosis, radiation exposure(a total of 95% abnormal results are FP, many cancers are indolent and will not lead to death if left alone)  He declines screening today              Other Visit Diagnoses     Screening for colon cancer        Relevant Orders    Ambulatory Referral to Gastroenterology    Screening for diabetes mellitus (DM)        Relevant Orders    Comprehensive metabolic panel    Screening for deficiency anemia        Relevant Orders    CBC and Platelet    Screening for hyperlipidemia        Relevant Orders    Lipid panel Screening for HIV (human immunodeficiency virus)        Relevant Orders    HIV 1/2 Antigen/Antibody (4th Generation) w Reflex [facility]    Need for hepatitis C screening test        Relevant Orders    Hepatitis C antibody    BMI 26 0-26 9,adult        Prostate cancer screening        Relevant Orders    PSA, Total Screen          Immunizations and preventive care screenings were discussed with patient today  Appropriate education was printed on patient's after visit summary  Vaccines UTD  Counseling:  Alcohol/drug use: discussed moderation in alcohol intake, the recommendations for healthy alcohol use, and avoidance of illicit drug use  Has a ho alcoholism and is in remission for 3 years  Dental Health: discussed importance of regular tooth brushing, flossing, and dental visits  · Exercise: the importance of regular exercise/physical activity was discussed  Recommend exercise 3-5 times per week for at least 30 minutes  BMI Counseling: Body mass index is 26 52 kg/m²  The BMI is above normal  Nutrition recommendations include decreasing portion sizes and limiting drinks that contain sugar  Rationale for BMI follow-up plan is due to patient being overweight or obese  Depression Screening and Follow-up Plan: Patient was screened for depression during today's encounter  They screened negative with a PHQ-2 score of 0  Return in 1 year (on 3/7/2023)  Chief Complaint:     Chief Complaint   Patient presents with    Physical Exam      History of Present Illness:     Adult Annual Physical   Patient here for a comprehensive physical exam  The patient reports no problems  Diet and Physical Activity  · Diet/Nutrition: well balanced diet  · Exercise: vigorous cardiovascular exercise        Depression Screening  PHQ-2/9 Depression Screening    Little interest or pleasure in doing things: 0 - not at all  Feeling down, depressed, or hopeless: 0 - not at all  PHQ-2 Score: 0  PHQ-2 Interpretation: Negative depression screen       General Health  · Sleep: sleeps well  · Hearing: no issues  · Vision: no vision problems  · Dental: regular dental visits   Health  · Symptoms include: none     Review of Systems:     Review of Systems   Constitutional: Negative for activity change, appetite change, fever and unexpected weight change  HENT: Negative for ear pain, postnasal drip and rhinorrhea  Eyes: Negative for photophobia and pain  Respiratory: Negative for cough, shortness of breath and wheezing  Cardiovascular: Negative for chest pain, palpitations and leg swelling  Gastrointestinal: Negative for abdominal pain, blood in stool, nausea and vomiting  Endocrine: Negative for polydipsia and polyuria  Genitourinary: Negative for difficulty urinating, hematuria and urgency  Musculoskeletal: Negative for myalgias  Skin: Negative for rash  Neurological: Negative for dizziness  Psychiatric/Behavioral: Negative for confusion and sleep disturbance  Past Medical History:     Past Medical History:   Diagnosis Date    Anxiety     [facility] )       Past Surgical History:     No past surgical history on file     Family History:     Family History   Problem Relation Age of Onset    Stroke Family     Hypertension Family     Mental illness Family       Social History:     Social History     Socioeconomic History    Marital status: Single     Spouse name: None    Number of children: None    Years of education: None    Highest education level: None   Occupational History    Occupation: [employer]    Tobacco Use    Smoking status: Current Every Day Smoker     Packs/day: 1 00     Years: 30 00     Pack years: 30 00     Types: Cigarettes    Smokeless tobacco: Never Used   Substance and Sexual Activity    Alcohol use: Not Currently    Drug use: Not Currently     Types: Cocaine    Sexual activity: None   Other Topics Concern    None   Social History Narrative    Single Lives with roommates     No caffeine use     Regular exercise      Social Determinants of Health     Financial Resource Strain: Not on file   Food Insecurity: Not on file   Transportation Needs: Not on file   Physical Activity: Not on file   Stress: Not on file   Social Connections: Not on file   Intimate Partner Violence: Not on file   Housing Stability: Not on file      Current Medications:     Current Outpatient Medications   Medication Sig Dispense Refill    buPROPion (WELLBUTRIN XL) 300 mg 24 hr tablet Take 1 tablet (300 mg total) by mouth every morning 30 tablet 5    escitalopram (LEXAPRO) 10 mg tablet Take 1 tablet (10 mg total) by mouth daily 30 tablet 5    hydrOXYzine HCL (ATARAX) 10 mg tablet Take 1 tablet (10 mg total) by mouth 2 (two) times a day as needed (anxiety/insomnia) 60 tablet 1    carbamide peroxide (DEBROX) 6 5 % otic solution Administer 5 drops into the left ear 2 (two) times a day (Patient not taking: Reported on 3/7/2022 ) 15 mL 0     No current facility-administered medications for this visit  Allergies:     No Known Allergies   Physical Exam:     /80 (BP Location: Left arm, Patient Position: Sitting, Cuff Size: Adult)   Pulse 78   Temp 98 2 °F (36 8 °C) (Tympanic)   Resp 16   Ht 5' 10" (1 778 m)   Wt 83 8 kg (184 lb 12 8 oz)   SpO2 98%   BMI 26 52 kg/m²     Physical Exam  Constitutional:       General: He is not in acute distress  Appearance: He is well-developed  HENT:      Head: Normocephalic and atraumatic  Right Ear: There is no impacted cerumen  Left Ear: There is impacted cerumen  Eyes:      General: No scleral icterus  Conjunctiva/sclera: Conjunctivae normal       Pupils: Pupils are equal, round, and reactive to light  Cardiovascular:      Rate and Rhythm: Normal rate and regular rhythm  Heart sounds: Normal heart sounds  No murmur heard  No friction rub  No gallop      Pulmonary:      Effort: Pulmonary effort is normal  No respiratory distress  Breath sounds: Normal breath sounds  No wheezing or rales  Abdominal:      General: Bowel sounds are normal  There is no distension  Palpations: Abdomen is soft  There is no mass  Tenderness: There is no abdominal tenderness  There is no guarding  Musculoskeletal:         General: No tenderness  Cervical back: Normal range of motion  Skin:     General: Skin is warm and dry  Findings: No rash  Neurological:      Mental Status: He is alert and oriented to person, place, and time  Cranial Nerves: No cranial nerve deficit  Motor: No abnormal muscle tone            Ara Villaseñor MD  [address] [place] [address]

## 2022-08-06 DIAGNOSIS — F43.22 ADJUSTMENT DISORDER WITH ANXIOUS MOOD: ICD-10-CM

## 2022-08-06 DIAGNOSIS — R41.840 ATTENTION DEFICIT: ICD-10-CM

## 2022-08-06 DIAGNOSIS — F41.1 GAD (GENERALIZED ANXIETY DISORDER): ICD-10-CM

## 2022-08-08 RX ORDER — BUPROPION HYDROCHLORIDE 300 MG/1
300 TABLET ORAL EVERY MORNING
Qty: 30 TABLET | Refills: 5 | Status: SHIPPED | OUTPATIENT
Start: 2022-08-08

## 2022-08-08 RX ORDER — ESCITALOPRAM OXALATE 10 MG/1
TABLET ORAL
Qty: 30 TABLET | Refills: 5 | Status: SHIPPED | OUTPATIENT
Start: 2022-08-08

## 2022-09-12 ENCOUNTER — OFFICE VISIT (OUTPATIENT)
Dept: PSYCHIATRY | Facility: CLINIC | Age: 57
End: 2022-09-12
Payer: COMMERCIAL

## 2022-09-12 DIAGNOSIS — F41.1 GAD (GENERALIZED ANXIETY DISORDER): Primary | ICD-10-CM

## 2022-09-12 PROCEDURE — 99214 OFFICE O/P EST MOD 30 MIN: CPT | Performed by: NURSE PRACTITIONER

## 2022-09-12 NOTE — PSYCH
This note was not shared with the patient due to reasonable likelihood of causing patient harm    MEDICATION MANAGEMENT NOTE        [address] [place] [address]    Name and Date of Birth:  Soledad Johnson 61 y o  1965 MRN: [id number]    Date of Visit: September 12, 2022    No Known Allergies  SUBJECTIVE:    Kalin Peterson is seen today for a follow up for Generalized Anxiety Disorder  He reports that he has done well since the last visit  Patient continues to do well  Is able to retain full-time employment transferring to [employer]  Maintains consistent attendance at [employer]  Feels anxious at times, when stressed can have some decreased appetite  However, anxiety overall fairly well managed  Does worry about his parents health sometimes  Noticed he was missing medication doses relatively frequently  Felt off days when missing doses  Now makes an effort to make sure he takes medication every day  He denies any side effects from medications  PLAN:  Continue Lexapro 10 mg p o  Daily  Follow-up in 6 months  Aware of 24 hour and weekend coverage for urgent situations accessed by calling [facility] main practice number    Diagnoses and all orders for this visit:    KAITLIN (generalized anxiety disorder)        Current Outpatient Medications on File Prior to Visit   Medication Sig Dispense Refill    buPROPion (WELLBUTRIN XL) 300 mg 24 hr tablet TAKE 1 TABLET (300 MG TOTAL) BY MOUTH EVERY MORNING   30 tablet 5    carbamide peroxide (DEBROX) 6 5 % otic solution Administer 5 drops into the left ear 2 (two) times a day (Patient not taking: Reported on 3/7/2022 ) 15 mL 0    escitalopram (LEXAPRO) 10 mg tablet TAKE 1 TABLET BY MOUTH EVERY DAY 30 tablet 5    hydrOXYzine HCL (ATARAX) 10 mg tablet Take 1 tablet (10 mg total) by mouth 2 (two) times a day as needed (anxiety/insomnia) 60 tablet 1    sildenafil (VIAGRA) 25 MG tablet Take 1 tablet (25 mg total) by mouth daily as needed for erectile dysfunction 10 tablet 0     No current facility-administered medications on file prior to visit  Psychotherapy Provided:     Individual psychotherapy provided: Yes  Counseling was provided during the session today for 16 minutes  Supportive counseling provided  HPI ROS Appetite Changes and Sleep:     He reports normal sleep, normal appetite, normal energy level   Denies homicidal ideation, denies suicidal ideation    Review Of Systems:      General normal    Personality no change in personality   Constitutional negative   ENT negative   Cardiovascular negative   Respiratory negative   Gastrointestinal negative   Genitourinary negative   Musculoskeletal negative   Integumentary negative   Neurological negative   Endocrine negative   Other Symptoms none, all other systems are negative     Mental Status Evaluation:    Appearance Adequate hygiene and grooming   Behavior calm and cooperative   Mood anxious  Depression Scale - 0 of 10 (0 = No depression)  Anxiety Scale -  of 10 (0 = No anxiety)   Speech Normal rate and volume   Affect appropriate and mood-congruent   Thought Processes Goal directed and coherent   Thought Content Does not verbalize delusional material   Associations Tightly connected   Perceptual Disturbances Denies hallucinations and does not appear to be responding to internal stimuli   Risk Potential Suicidal/Homicidal Ideation - No evidence of suicidal or homicidal ideation and patient does not verbalize suicidal or homicidal ideation  Risk of Violence - No evidence of risk for violence found on assessment  Risk of Self Mutilation - No evidence of risk for self mutilation found on assessment   Orientation oriented to person, place, time/date and situation   Memory recent and remote memory grossly intact   Consciousness alert and awake   Attention/Concentration attention span and concentration are age appropriate   Insight fair   Judgement intact Muscle Strength and Gait normal muscle strength and normal muscle tone, normal gait/station and normal balance   Motor Activity no abnormal movements   Language no difficulty naming common objects, no difficulty repeating a phrase, no difficulty writing a sentence   Fund of Knowledge adequate knowledge of current events  adequate fund of knowledge regarding past history  adequate fund of knowledge regarding vocabulary      Past Psychiatric History Update:     Inpatient Psychiatric Admission Since Last Encounter:   no  Changes to Outpatient Psychiatric Treatment Team:    no  Suicide Attempt Or Self Mutilation Since Last Encounter:   no  Incidence of Violent Behavior Since Last Encounter:   no    Traumatic History Update:     New Onset of Abuse Since Last Encounter:   no  Traumatic Events Since Last Encounter:   no    Past Medical History:    Past Medical History:   Diagnosis Date    Anxiety     Substance abuse ([facility] [address] )         No past surgical history on file    No Known Allergies  Substance Abuse History:    Social History     Substance and Sexual Activity   Alcohol Use Not Currently     Social History     Substance and Sexual Activity   Drug Use Not Currently    Types: Cocaine     Social History:    Social History     Socioeconomic History    Marital status: Single     Spouse name: Not on file    Number of children: Not on file    Years of education: Not on file    Highest education level: Not on file   Occupational History    Occupation: [employer]    Tobacco Use    Smoking status: Current Every Day Smoker     Packs/day: 1 00     Years: 30 00     Pack years: 30 00     Types: Cigarettes    Smokeless tobacco: Never Used   Substance and Sexual Activity    Alcohol use: Not Currently    Drug use: Not Currently     Types: Cocaine    Sexual activity: Not on file   Other Topics Concern    Not on file   Social History Narrative    Single     Lives with roommates     No caffeine use     Regular exercise Social Determinants of Health     Financial Resource Strain: Not on file   Food Insecurity: Not on file   Transportation Needs: Not on file   Physical Activity: Not on file   Stress: Not on file   Social Connections: Not on file   Intimate Partner Violence: Not on file   Housing Stability: Not on file     Family Psychiatric History:     Family History   Problem Relation Age of Onset    Stroke Family     Hypertension Family     Mental illness Family      History Review: The following portions of the patient's history were reviewed and updated as appropriate: allergies, current medications, past family history, past medical history, past social history, past surgical history and problem list     OBJECTIVE:     Vital signs in last 24 hours: There were no vitals filed for this visit  Laboratory Results: I have personally reviewed all pertinent laboratory/tests results  Suicide/Homicide Risk Assessment:    Risk of Harm to Self:   The following ratings are based on assessment at the time of the interview    Risk of Harm to Others:   The following ratings are based on assessment at the time of the interview   Recent Specific Risk Factors include: none  The following interventions are recommended: no intervention changes needed    Medications Risks/Benefits:      Risks, Benefits And Possible Side Effects Of Medications:    Discussed risks and benefits of treatment with patient including risk of suicidality, serotonin syndrome, increased QTc interval and SIADH related to treatment with antidepressants;  Risk of induction of manic symptoms in certain patient populations     Controlled Medication Discussion:     Not applicable    Treatment Plan:    Due for update/Updated:   yes    AUNDREA Montague 09/12/22

## 2022-09-12 NOTE — BH TREATMENT PLAN
TREATMENT PLAN (Medication Management Only)        [facility]    Name and Date of Birth:  Alfred Mario 61 y o  1965  Date of Treatment Plan: September 12, 2022  Diagnosis/Diagnoses:  No diagnosis found  Strengths/Personal Resources for Self-Care: motivation for treatment, taking medications as prescribed, ability to communicate well, ability to listen, ability to reason  Area/Areas of need (in own words): depressive symptoms, anxiety symptoms  1  Long Term Goal: maintain improvement in anxiety and maintain improvement in depression  Target Date: 6 months - 3/12/2023  Person/Persons responsible for completion of goal: Duane E Grant  2  Short Term Objective (s) - How will we reach this goal?:   A  Provider new recommended medication/dosage changes and/or continue medication(s): continue current medications as prescribed  B   Attend medication management appointments regularly  C   n/a  Target Date: 6 months - 3/12/2023  Person/Persons Responsible for Completion of Goal: Duane E Grant  Progress Towards Goals: continuing treatment   Treatment Modality: medication management with psychotherapy every 12 months  Review due 90 to 120 days from date of this plan: 6 months - 3/12/2023  Expected length of service: maintenance unless revised  My Physician/PA/NP and I have developed this plan together and I agree to work on the goals and objectives  I understand the treatment goals that were developed for my treatment

## 2023-03-26 DIAGNOSIS — F41.1 GAD (GENERALIZED ANXIETY DISORDER): ICD-10-CM

## 2023-03-26 DIAGNOSIS — R41.840 ATTENTION DEFICIT: ICD-10-CM

## 2023-03-26 DIAGNOSIS — F43.22 ADJUSTMENT DISORDER WITH ANXIOUS MOOD: ICD-10-CM

## 2023-03-30 DIAGNOSIS — R41.840 ATTENTION DEFICIT: ICD-10-CM

## 2023-03-30 DIAGNOSIS — F41.1 GAD (GENERALIZED ANXIETY DISORDER): ICD-10-CM

## 2023-03-30 DIAGNOSIS — F43.22 ADJUSTMENT DISORDER WITH ANXIOUS MOOD: ICD-10-CM

## 2023-03-30 RX ORDER — ESCITALOPRAM OXALATE 10 MG/1
10 TABLET ORAL DAILY
Qty: 30 TABLET | Refills: 0 | Status: SHIPPED | OUTPATIENT
Start: 2023-03-30 | End: 2023-04-29

## 2023-03-30 RX ORDER — BUPROPION HYDROCHLORIDE 300 MG/1
300 TABLET ORAL EVERY MORNING
Qty: 30 TABLET | Refills: 0 | Status: SHIPPED | OUTPATIENT
Start: 2023-03-30 | End: 2023-04-29

## 2023-03-30 NOTE — TELEPHONE ENCOUNTER
Chart reviewed  Refills were sent to the patient's preferred pharmacy, covering patient's primary psychiatric provider, Judd Rasmussen

## 2023-04-28 ENCOUNTER — TELEPHONE (OUTPATIENT)
Dept: PSYCHIATRY | Facility: CLINIC | Age: 58
End: 2023-04-28

## 2023-04-28 ENCOUNTER — NURSE TRIAGE (OUTPATIENT)
Dept: OTHER | Facility: OTHER | Age: 58
End: 2023-04-28

## 2023-04-28 DIAGNOSIS — F41.1 GAD (GENERALIZED ANXIETY DISORDER): ICD-10-CM

## 2023-04-28 DIAGNOSIS — F43.22 ADJUSTMENT DISORDER WITH ANXIOUS MOOD: ICD-10-CM

## 2023-04-28 DIAGNOSIS — R41.840 ATTENTION DEFICIT: ICD-10-CM

## 2023-04-28 RX ORDER — ESCITALOPRAM OXALATE 10 MG/1
10 TABLET ORAL DAILY
Qty: 7 TABLET | Refills: 0 | Status: SHIPPED | OUTPATIENT
Start: 2023-04-28 | End: 2023-05-01 | Stop reason: SDUPTHER

## 2023-04-28 RX ORDER — BUPROPION HYDROCHLORIDE 300 MG/1
300 TABLET ORAL EVERY MORNING
Qty: 7 TABLET | Refills: 0 | Status: SHIPPED | OUTPATIENT
Start: 2023-04-28 | End: 2023-05-01 | Stop reason: SDUPTHER

## 2023-04-28 NOTE — TELEPHONE ENCOUNTER
"Regarding: Urgent refills  ----- Message from Vinay William sent at 4/28/2023  5:45 PM EDT -----  \" Need my medication to be refills I don't more left   \"     Medication Refill Request     Name buPROPion (WELLBUTRIN XL) 300 mg 24 hr tablet  Dose/Frequency Take 1 tablet   Quantity 30 tablet   Verified pharmacy     Verified ordering Provider     Does patient have enough for the next 3 days? Yes  No     Medication Refill Request     Name escitalopram (LEXAPRO) 10 mg tablet  Dose/Frequency Take 1   Quantity 30 tablet   Verified pharmacy     Verified ordering Provider     Does patient have enough for the next 3 days?  Yes  No    "

## 2023-04-28 NOTE — TELEPHONE ENCOUNTER
"  Reason for Disposition  • Caller has medicine question only, adult not sick, AND triager answers question    Answer Assessment - Initial Assessment Questions  1  NAME of MEDICATION: \"What medicine are you calling about? \"      Wellbutrin, and Lexapro  2  QUESTION: Brii Garcíaer is your question? \" (e g , medication refill, side effect)    Need med refill  3  PRESCRIBING HCP: \"Who prescribed it? \" Reason: if prescribed by specialist, call should be referred to that group  Psychiatry   4  SYMPTOMS: \"Do you have any symptoms? \"     Denies   5  SEVERITY: If symptoms are present, ask \"Are they mild, moderate or severe? \"     n/a    Protocols used: MEDICATION QUESTION CALL-ADULT-    "

## 2023-04-28 NOTE — TELEPHONE ENCOUNTER
Duane left a message with [facility]aza, nursing  He requested refills and a call back  Please follow up

## 2023-04-28 NOTE — TELEPHONE ENCOUNTER
Medication marked as essential  Courtesy seven-day supply given per protocol  Sent to pharmacy (verified) of choice  Patient informed via verified VM

## 2023-05-01 ENCOUNTER — TELEPHONE (OUTPATIENT)
Dept: PSYCHIATRY | Facility: CLINIC | Age: 58
End: 2023-05-01

## 2023-05-01 DIAGNOSIS — F41.1 GAD (GENERALIZED ANXIETY DISORDER): ICD-10-CM

## 2023-05-01 DIAGNOSIS — F43.22 ADJUSTMENT DISORDER WITH ANXIOUS MOOD: ICD-10-CM

## 2023-05-01 DIAGNOSIS — R41.840 ATTENTION DEFICIT: ICD-10-CM

## 2023-05-01 RX ORDER — ESCITALOPRAM OXALATE 10 MG/1
10 TABLET ORAL DAILY
Qty: 30 TABLET | Refills: 0 | Status: SHIPPED | OUTPATIENT
Start: 2023-05-01 | End: 2023-05-31

## 2023-05-01 RX ORDER — BUPROPION HYDROCHLORIDE 300 MG/1
300 TABLET ORAL EVERY MORNING
Qty: 30 TABLET | Refills: 0 | Status: SHIPPED | OUTPATIENT
Start: 2023-05-01 | End: 2023-05-31

## 2023-05-01 NOTE — TELEPHONE ENCOUNTER
Follow up call to [place] Duane states he was only given a 7 day supply of his medications and that will not be enough until upcoming appointment with Trini on 5/23  Duane is requesting that a 30 day supply be sent  Will refer to [facility] for review

## 2023-05-15 ENCOUNTER — TELEPHONE (OUTPATIENT)
Dept: FAMILY MEDICINE CLINIC | Facility: CLINIC | Age: 58
End: 2023-05-15

## 2023-05-15 NOTE — TELEPHONE ENCOUNTER
Patient called and left a message asking when his next appt was scheduled for  Called patient there was no answer, left a message for patient to call back
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
## 2023-05-22 ENCOUNTER — RA CDI HCC (OUTPATIENT)
Dept: OTHER | Facility: HOSPITAL | Age: 58
End: 2023-05-22

## 2023-05-22 NOTE — PROGRESS NOTES
[facility]  coding opportunities       Chart reviewed, no opportunity found: CHART REVIEWED, NO OPPORTUNITY FOUND        Patients Insurance        Commercial Insurance: [employer]

## 2023-05-23 ENCOUNTER — OFFICE VISIT (OUTPATIENT)
Dept: PSYCHIATRY | Facility: CLINIC | Age: 58
End: 2023-05-23

## 2023-05-23 DIAGNOSIS — F43.22 ADJUSTMENT DISORDER WITH ANXIOUS MOOD: ICD-10-CM

## 2023-05-23 DIAGNOSIS — R41.840 ATTENTION DEFICIT: ICD-10-CM

## 2023-05-23 DIAGNOSIS — F41.1 GAD (GENERALIZED ANXIETY DISORDER): ICD-10-CM

## 2023-05-23 RX ORDER — ESCITALOPRAM OXALATE 10 MG/1
10 TABLET ORAL DAILY
Qty: 30 TABLET | Refills: 1 | Status: SHIPPED | OUTPATIENT
Start: 2023-05-23 | End: 2023-07-22

## 2023-05-23 RX ORDER — BUPROPION HYDROCHLORIDE 300 MG/1
300 TABLET ORAL EVERY MORNING
Qty: 30 TABLET | Refills: 1 | Status: SHIPPED | OUTPATIENT
Start: 2023-05-23 | End: 2023-07-22

## 2023-05-23 NOTE — BH TREATMENT PLAN
TREATMENT PLAN (Medication Management Only)        [facility]    Name and Date of Birth:  Jose Del Rosraio 61 y o  1965  Date of Treatment Plan: May 23, 2023  Diagnosis/Diagnoses:    1  Adjustment disorder with anxious mood    2  KAITLIN (generalized anxiety disorder)    3  Attention deficit      Strengths/Personal Resources for Self-Care: ability to communicate needs, ability to understand psychiatric illness, average or above intelligence, willingness to work on problems  Area/Areas of need (in own words): anxiety symptoms  1  Long Term Goal: continue improvement in anxiety  Target Date:4 months - 9/23/2023  Person/Persons responsible for completion of goal: Duane  2  Short Term Objective (s) - How will we reach this goal?:   A  Provider new recommended medication/dosage changes and/or continue medication(s): continue current medications as prescribed  B  N/A   C  N/A  Target Date:4 months - 9/23/2023  Person/Persons Responsible for Completion of Goal: Duane  Progress Towards Goals: continuing treatment  Treatment Modality: medication management every 4 months  Review due 180 days from date of this plan: 6 months - 11/23/2023  Expected length of service: ongoing treatment  My Physician/PA/NP and I have developed this plan together and I agree to work on the goals and objectives  I understand the treatment goals that were developed for my treatment

## 2023-05-30 ENCOUNTER — OFFICE VISIT (OUTPATIENT)
Dept: FAMILY MEDICINE CLINIC | Facility: CLINIC | Age: 58
End: 2023-05-30

## 2023-05-30 VITALS
OXYGEN SATURATION: 97 % | HEIGHT: 70 IN | DIASTOLIC BLOOD PRESSURE: 82 MMHG | SYSTOLIC BLOOD PRESSURE: 128 MMHG | TEMPERATURE: 98.4 F | BODY MASS INDEX: 27.54 KG/M2 | HEART RATE: 90 BPM | WEIGHT: 192.4 LBS | RESPIRATION RATE: 16 BRPM

## 2023-05-30 DIAGNOSIS — Z12.11 SCREENING FOR COLON CANCER: ICD-10-CM

## 2023-05-30 DIAGNOSIS — Z11.59 NEED FOR HEPATITIS C SCREENING TEST: ICD-10-CM

## 2023-05-30 DIAGNOSIS — Z13.1 SCREENING FOR DIABETES MELLITUS (DM): ICD-10-CM

## 2023-05-30 DIAGNOSIS — Z23 ENCOUNTER FOR VACCINATION: ICD-10-CM

## 2023-05-30 DIAGNOSIS — Z00.00 ANNUAL PHYSICAL EXAM: Primary | ICD-10-CM

## 2023-05-30 DIAGNOSIS — Z12.5 SCREENING FOR PROSTATE CANCER: ICD-10-CM

## 2023-05-30 DIAGNOSIS — Z13.0 SCREENING FOR DEFICIENCY ANEMIA: ICD-10-CM

## 2023-05-30 DIAGNOSIS — Z11.4 ENCOUNTER FOR SCREENING FOR HIV: ICD-10-CM

## 2023-05-30 DIAGNOSIS — Z13.220 SCREENING FOR HYPERLIPIDEMIA: ICD-10-CM

## 2023-05-30 NOTE — ASSESSMENT & PLAN NOTE
Controlled on lexapro and hydroxyzine  Goes to psych 
Not ready to quit 
Pt has never had a colonoscopy  Discussed and order placed today  PSA screening discussed and accepted today  Pt is a smoker with a 30PY  The uspstf recommends annual low dose CT to screen for lung cancer is otherwise healthy adults 55-80 with a 30 py and who currently smoke or have quit within the last 15 years  The center for Medicare & Medicaid Services pays up to age 68  The AAFP does not indorse this as the evidence is insufficient  In the National Lung screening Trial, 3/1000 people screened annually for 3 yrs and followed for 5 years avoided death  Discussed the risks and benefits with the pt  Harms include false positives, overdiagnosis, radiation exposure(a total of 95% abnormal results are FP, many cancers are indolent and will not lead to death if left alone)  He declines screening today 
no

## 2023-05-30 NOTE — PROGRESS NOTES
ADULT ANNUAL PHYSICAL  [address]    NAME: Yarely Addison  AGE: 61 y o  SEX: male  : 1965     DATE: 2023     Assessment and Plan:     Problem List Items Addressed This Visit        Other    Annual physical exam - Primary     Pt has never had a colonoscopy  Discussed and order placed today  PSA screening discussed and accepted today  Pt is a smoker with a 30PY  The uspstf recommends annual low dose CT to screen for lung cancer is otherwise healthy adults 55-80 with a 30 py and who currently smoke or have quit within the last 15 years  The [facility] Medicare [facility] Medicaid Services pays up to age 68  The AAFP does not indorse this as the evidence is insufficient  In the National Lung screening Trial, 3/1000 people screened annually for 3 yrs and followed for 5 years avoided death  Discussed the risks and benefits with the pt  Harms include false positives, overdiagnosis, radiation exposure(a total of 95% abnormal results are FP, many cancers are indolent and will not lead to death if left alone)  He declines screening today           Other Visit Diagnoses     Screening for prostate cancer        Relevant Orders    PSA, Total Screen    Need for hepatitis C screening test        Relevant Orders    Hepatitis C antibody    Screening for colon cancer        Relevant Orders    Ambulatory referral for Colonoscopy    Screening for hyperlipidemia        Relevant Orders    Lipid panel    Screening for diabetes mellitus (DM)        Relevant Orders    Comprehensive metabolic panel    Screening for deficiency anemia        Relevant Orders    CBC and Platelet    Encounter for screening for HIV        Relevant Orders    HIV 1/2 AB/AG w Reflex [facility] for 2 yr old and above    Encounter for vaccination        Relevant Orders    Pneumococcal Conjugate Vaccine 20-valent (Pcv20)          Immunizations and preventive care screenings were discussed with patient today  Appropriate education was printed on patient's after visit summary  Discussed risks and benefits of prostate cancer screening  We discussed the controversial history of PSA screening for prostate cancer in the [place] as well as the risk of over detection and over treatment of prostate cancer by way of PSA screening  The patient understands that PSA blood testing is an imperfect way to screen for prostate cancer and that elevated PSA levels in the blood may also be caused by infection, inflammation, prostatic trauma or manipulation, urological procedures, or by benign prostatic enlargement  The role of the digital rectal examination in prostate cancer screening was also discussed and I discussed with him that there is large interobserver variability in the findings of digital rectal examination  Counseling:  Alcohol/drug use: discussed moderation in alcohol intake, the recommendations for healthy alcohol use, and avoidance of illicit drug use  Dental Health: discussed importance of regular tooth brushing, flossing, and dental visits  Exercise: the importance of regular exercise/physical activity was discussed  Recommend exercise 3-5 times per week for at least 30 minutes  Return in 1 year (on 5/30/2024)  Chief Complaint:     Chief Complaint   Patient presents with   • Physical Exam        History of Present Illness:     Adult Annual Physical   Patient here for a comprehensive physical exam  The patient reports no problems  Diet and Physical Activity  Diet/Nutrition: well balanced diet  Exercise: vigorous cardiovascular exercise  Depression Screening  PHQ-2/9 Depression Screening    Little interest or pleasure in doing things: 0 - not at all  Feeling down, depressed, or hopeless: 0 - not at all  PHQ-2 Score: 0  PHQ-2 Interpretation: Negative depression screen       General Health  Sleep: sleeps well  Hearing: no issues  Vision: no vision problems     Dental: regular dental visits   Health  Symptoms include: none     Review of Systems:     Review of Systems   Constitutional: Negative for activity change, appetite change, fever and unexpected weight change  HENT: Negative for ear pain, postnasal drip and rhinorrhea  Eyes: Negative for photophobia and pain  Respiratory: Negative for cough, shortness of breath and wheezing  Cardiovascular: Negative for chest pain, palpitations and leg swelling  Gastrointestinal: Negative for abdominal pain, blood in stool, nausea and vomiting  Endocrine: Negative for polydipsia and polyuria  Genitourinary: Negative for difficulty urinating, hematuria and urgency  Musculoskeletal: Negative for myalgias  Skin: Negative for rash  Neurological: Negative for dizziness  Psychiatric/Behavioral: Negative for confusion and sleep disturbance  Past Medical History:     Past Medical History:   Diagnosis Date   • Anxiety    • [facility] )       Past Surgical History:     No past surgical history on file     Family History:     Family History   Problem Relation Age of Onset   • Stroke Family    • Hypertension Family    • Mental illness Family       Social History:     Social History     Socioeconomic History   • Marital status: Single     Spouse name: None   • Number of children: None   • Years of education: None   • Highest education level: None   Occupational History   • Occupation: [employer]    Tobacco Use   • Smoking status: Every Day     Packs/day: 1 00     Years: 30 00     Total pack years: 30 00     Types: Cigarettes   • Smokeless tobacco: Never   Substance and Sexual Activity   • Alcohol use: Not Currently   • Drug use: Not Currently     Types: Cocaine   • Sexual activity: None   Other Topics Concern   • None   Social History Narrative    Single     Lives with roommates     No caffeine use     Regular exercise      Social Determinants of Health     Financial Resource Strain: Low Risk  (2/8/2021) Overall Financial Resource Strain (CARDIA)    • Difficulty of Paying Living Expenses: Not hard at all   Food Insecurity: No Food Insecurity (2/8/2021)    Hunger Vital Sign    • Worried About Running Out of Food in the Last Year: Never true    • Ran Out of Food in the Last Year: Never true   Transportation Needs: Unknown (2/8/2021)    PRAPARE - Transportation    • Lack of Transportation (Medical): No    • Lack of Transportation (Non-Medical):  Not on file   Physical Activity: Sufficiently Active (2/8/2021)    Exercise Vital Sign    • Days of Exercise per Week: 4 days    • Minutes of Exercise per Session: 40 min   Stress: No Stress Concern Present (2/8/2021)    [address]    • Feeling of Stress : Not at all   Social Connections: Unknown (2/8/2021)    Social Connection and Isolation Panel [NHANES]    • Frequency of Communication with Friends and Family: Patient refused    • Frequency of Social Gatherings with Friends and Family: Patient refused    • Attends Holiness Services: Patient refused    • Active Member of Clubs or Organizations: Patient refused    • Attends Club or Organization Meetings: Patient refused    • Marital Status: Patient refused   Intimate Partner Violence: Not At Risk (2/8/2021)    Humiliation, Afraid, Rape, and Kick questionnaire    • Fear of Current or Ex-Partner: No    • Emotionally Abused: No    • Physically Abused: No    • Sexually Abused: No   Housing Stability: Not on file      Current Medications:     Current Outpatient Medications   Medication Sig Dispense Refill   • buPROPion (WELLBUTRIN XL) 300 mg 24 hr tablet Take 1 tablet (300 mg total) by mouth every morning 30 tablet 1   • escitalopram (LEXAPRO) 10 mg tablet Take 1 tablet (10 mg total) by mouth daily 30 tablet 1   • hydrOXYzine HCL (ATARAX) 10 mg tablet Take 1 tablet (10 mg total) by mouth 2 (two) times a day as needed (anxiety/insomnia) 60 tablet 1   • carbamide "peroxide (DEBROX) 6 5 % otic solution Administer 5 drops into the left ear 2 (two) times a day (Patient not taking: Reported on 3/7/2022 ) 15 mL 0   • sildenafil (VIAGRA) 25 MG tablet Take 1 tablet (25 mg total) by mouth daily as needed for erectile dysfunction 10 tablet 0     No current facility-administered medications for this visit  Allergies:     No Known Allergies   Physical Exam:     /82 (BP Location: Left arm, Patient Position: Sitting, Cuff Size: Adult)   Pulse 90   Temp 98 4 °F (36 9 °C) (Tympanic)   Resp 16   Ht 5' 10\" (1 778 m)   Wt 87 3 kg (192 lb 6 4 oz)   SpO2 97%   BMI 27 61 kg/m²     Physical Exam  Constitutional:       General: He is not in acute distress  Appearance: He is well-developed  HENT:      Head: Normocephalic and atraumatic  Eyes:      General: No scleral icterus  Conjunctiva/sclera: Conjunctivae normal       Pupils: Pupils are equal, round, and reactive to light  Cardiovascular:      Rate and Rhythm: Normal rate and regular rhythm  Heart sounds: Normal heart sounds  No murmur heard  No friction rub  No gallop  Pulmonary:      Effort: Pulmonary effort is normal  No respiratory distress  Breath sounds: Normal breath sounds  No wheezing or rales  Abdominal:      General: Bowel sounds are normal  There is no distension  Palpations: Abdomen is soft  There is no mass  Tenderness: There is no abdominal tenderness  There is no guarding  Musculoskeletal:         General: No tenderness  Cervical back: Normal range of motion  Skin:     General: Skin is warm and dry  Findings: No rash  Neurological:      Mental Status: He is alert and oriented to person, place, and time  Cranial Nerves: No cranial nerve deficit  Motor: No abnormal muscle tone            Ava Albarado MD  [address] [place] [address]  "

## 2023-06-06 ENCOUNTER — APPOINTMENT (OUTPATIENT)
Dept: LAB | Age: 58
End: 2023-06-06
Payer: COMMERCIAL

## 2023-06-06 DIAGNOSIS — Z13.220 SCREENING FOR HYPERLIPIDEMIA: ICD-10-CM

## 2023-06-06 DIAGNOSIS — Z11.59 NEED FOR HEPATITIS C SCREENING TEST: ICD-10-CM

## 2023-06-06 DIAGNOSIS — Z11.4 ENCOUNTER FOR SCREENING FOR HIV: ICD-10-CM

## 2023-06-06 DIAGNOSIS — Z12.5 SCREENING FOR PROSTATE CANCER: ICD-10-CM

## 2023-06-06 DIAGNOSIS — Z13.1 SCREENING FOR DIABETES MELLITUS (DM): ICD-10-CM

## 2023-06-06 DIAGNOSIS — Z13.0 SCREENING FOR DEFICIENCY ANEMIA: ICD-10-CM

## 2023-06-06 LAB
ALBUMIN SERPL BCP-MCNC: 3.7 G/DL (ref 3.5–5)
ALP SERPL-CCNC: 83 U/L (ref 46–116)
ALT SERPL W P-5'-P-CCNC: 34 U/L (ref 12–78)
ANION GAP SERPL CALCULATED.3IONS-SCNC: 3 MMOL/L (ref 4–13)
AST SERPL W P-5'-P-CCNC: 32 U/L (ref 5–45)
BILIRUB SERPL-MCNC: 0.6 MG/DL (ref 0.2–1)
BUN SERPL-MCNC: 17 MG/DL (ref 5–25)
CALCIUM SERPL-MCNC: 9.1 MG/DL (ref 8.3–10.1)
CHLORIDE SERPL-SCNC: 107 MMOL/L (ref 96–108)
CHOLEST SERPL-MCNC: 175 MG/DL
CO2 SERPL-SCNC: 25 MMOL/L (ref 21–32)
CREAT SERPL-MCNC: 1.34 MG/DL (ref 0.6–1.3)
ERYTHROCYTE [DISTWIDTH] IN BLOOD BY AUTOMATED COUNT: 14.6 % (ref 11.6–15.1)
GFR SERPL CREATININE-BSD FRML MDRD: 57 ML/MIN/1.73SQ M
GLUCOSE P FAST SERPL-MCNC: 89 MG/DL (ref 65–99)
HCT VFR BLD AUTO: 48.4 % (ref 36.5–49.3)
HCV AB SER QL: NORMAL
HDLC SERPL-MCNC: 61 MG/DL
HGB BLD-MCNC: 15.4 G/DL (ref 12–17)
HIV 1+2 AB+HIV1 P24 AG SERPL QL IA: NORMAL
HIV 2 AB SERPL QL IA: NORMAL
HIV1 AB SERPL QL IA: NORMAL
HIV1 P24 AG SERPL QL IA: NORMAL
LDLC SERPL CALC-MCNC: 104 MG/DL (ref 0–100)
MCH RBC QN AUTO: 28.8 PG (ref 26.8–34.3)
MCHC RBC AUTO-ENTMCNC: 31.8 G/DL (ref 31.4–37.4)
MCV RBC AUTO: 91 FL (ref 82–98)
NONHDLC SERPL-MCNC: 114 MG/DL
PLATELET # BLD AUTO: 257 THOUSANDS/UL (ref 149–390)
PMV BLD AUTO: 9.7 FL (ref 8.9–12.7)
POTASSIUM SERPL-SCNC: 4.2 MMOL/L (ref 3.5–5.3)
PROT SERPL-MCNC: 8.1 G/DL (ref 6.4–8.4)
PSA SERPL-MCNC: 1.04 NG/ML (ref 0–4)
RBC # BLD AUTO: 5.35 MILLION/UL (ref 3.88–5.62)
SODIUM SERPL-SCNC: 135 MMOL/L (ref 135–147)
TRIGL SERPL-MCNC: 51 MG/DL
WBC # BLD AUTO: 5.17 THOUSAND/UL (ref 4.31–10.16)

## 2023-06-06 PROCEDURE — G0103 PSA SCREENING: HCPCS

## 2023-06-06 PROCEDURE — 36415 COLL VENOUS BLD VENIPUNCTURE: CPT

## 2023-06-06 PROCEDURE — 80053 COMPREHEN METABOLIC PANEL: CPT

## 2023-06-06 PROCEDURE — 85027 COMPLETE CBC AUTOMATED: CPT

## 2023-06-06 PROCEDURE — 87389 HIV-1 AG W/HIV-1&-2 AB AG IA: CPT

## 2023-06-06 PROCEDURE — 86803 HEPATITIS C AB TEST: CPT

## 2023-06-06 PROCEDURE — 80061 LIPID PANEL: CPT

## 2023-06-12 ENCOUNTER — TELEPHONE (OUTPATIENT)
Dept: GASTROENTEROLOGY | Facility: CLINIC | Age: 58
End: 2023-06-12

## 2023-06-12 ENCOUNTER — PREP FOR PROCEDURE (OUTPATIENT)
Dept: GASTROENTEROLOGY | Facility: CLINIC | Age: 58
End: 2023-06-12

## 2023-06-12 DIAGNOSIS — Z12.11 SCREENING FOR COLON CANCER: Primary | ICD-10-CM

## 2023-06-12 DIAGNOSIS — Z12.11 SPECIAL SCREENING FOR MALIGNANT NEOPLASMS, COLON: Primary | ICD-10-CM

## 2023-06-12 RX ORDER — BISACODYL 5 MG/1
TABLET, DELAYED RELEASE ORAL
Qty: 2 TABLET | Refills: 0 | Status: SHIPPED | OUTPATIENT
Start: 2023-06-12

## 2023-06-12 NOTE — TELEPHONE ENCOUNTER
Scheduled date of colonoscopy (as of today): 7/3/2023  Physician performing colonoscopy: Dr Susan Ackerman  Location of colonoscopy: [facility]  Bowel prep reviewed with patient:  Instructions reviewed with patient by:  Clearances:

## 2023-06-12 NOTE — TELEPHONE ENCOUNTER
06/12/23  Screened by: Daniel FIORELLA Phillip    Referring Provider: Dr Yunier Nichols: There is no height or weight on file to calculate BMI  Has patient been referred for a routine screening Colonoscopy? yes  Is the patient between 39-70 years old? yes      Previous Colonoscopy no   If yes:    Date:     Facility:     Reason:       SCHEDULING STAFF: If the patient is between 45yrs-49yrs, please advise patient to confirm benefits/coverage with their insurance company for a routine screening colonoscopy, some insurance carriers will only cover at [id number] or older  If the patient is over 66years old, please schedule an office visit  Does the patient want to see a Gastroenterologist prior to their procedure OR are they having any GI symptoms? no    Has the patient been hospitalized or had abdominal surgery in the past 6 months? no    Does the patient use supplemental oxygen? no    Does the patient take Coumadin, Lovenox, Plavix, Elliquis, Xarelto, or other blood thinning medication? no    Has the patient had a stroke, cardiac event, or stent placed in the past year? no    SCHEDULING STAFF: If patient answers NO to above questions, then schedule procedure  If patient answers YES to above questions, then schedule office appointment  If patient is between 45yrs - 49yrs, please advise patient that we will have to confirm benefits & coverage with their insurance company for a routine screening colonoscopy

## 2023-06-12 NOTE — TELEPHONE ENCOUNTER
"[address] Assessment    Name: Camille Candelaria  YOB: 1965  Last Height: 5' 10\" (1 778 m)  Last weight: 87 3 kg (192 lb 6 4 oz)  BMI: 27 61 kg/m²  Procedure: Colon  Diagnosis: Z12 11  Date of procedure: 07/03/2023  Prep:   Responsible : Tony Iglesias  Phone#: [phone]   Name completing form: Erik Cabezas MA  Date form completed: 06/12/23      If the patient answers yes to any of these questions, schedule in a hospital  Are you pregnant: No  Do you rely on a wheelchair for mobility: No  Have you been diagnosed with End Stage Renal Disease (ESRD): No  Do you need oxygen during the day: No  Have you had a heart attack or stroke within the past three months: No  Have you had a seizure within the past three months: No  Have you ever been informed by anesthesia that you have a difficult airway: No  Additional Questions  Have you had any cardiac testing or are under the care of a Cardiologist (see cardiac list): No  Cardiac list:   Do you have an implanted cardiac defibrillator: No (Comment:  This patient should be scheduled in the hospital)    Have any bleeding problems, such as anemia or hemophilia (If patient has H&H result below 8, schedule in hospital   H&H must be within 30 days of procedure): No    Had an organ transplant within the past 3 months: No    Do you have any present infections: No  Do you get short of breath when walking a few blocks: No  Have you been diagnosed with diabetes: Yes  Comments (provide cardiac provider information if applicable):            "

## 2023-06-24 RX ORDER — BUPROPION HYDROCHLORIDE 300 MG/1
300 TABLET ORAL EVERY MORNING
Qty: 30 TABLET | Refills: 5 | OUTPATIENT
Start: 2023-06-24

## 2023-06-24 RX ORDER — ESCITALOPRAM OXALATE 10 MG/1
TABLET ORAL
Qty: 30 TABLET | Refills: 5 | OUTPATIENT
Start: 2023-06-24

## 2023-06-28 ENCOUNTER — NURSE TRIAGE (OUTPATIENT)
Dept: OTHER | Facility: OTHER | Age: 58
End: 2023-06-28

## 2023-06-28 NOTE — TELEPHONE ENCOUNTER
"Pt calling with diet questions prior to his colonoscopy  Diet guidelines reviewed  All questions answered  Reason for Disposition  • Colonoscopy, questions about    Answer Assessment - Initial Assessment Questions  1  DATE/TIME: \"When did you have your colonoscopy? \"       07/03/2023  2  MAIN CONCERN: Rohan Navarrete is your main concern right now? \" \"What questions do you have? \"      I want to know what fruits and veggies I can eat before my procedure    Protocols used: COLONOSCOPY SYMPTOMS AND QUESTIONS-ADULT-AH    "

## 2023-06-28 NOTE — TELEPHONE ENCOUNTER
"Regarding: pre op colonoscopy/wants to know what vegetables he can have  ----- Message from MOB[employer]deep [employer] sent at 6/28/2023  6:46 PM EDT -----  Pt called \"I am scheduled for a colonoscopy but I am wondering what vegetables I can have  \"    "

## 2023-06-30 RX ORDER — SODIUM CHLORIDE 9 MG/ML
125 INJECTION, SOLUTION INTRAVENOUS CONTINUOUS
Status: CANCELLED | OUTPATIENT
Start: 2023-06-30

## 2023-07-02 ENCOUNTER — NURSE TRIAGE (OUTPATIENT)
Dept: OTHER | Facility: OTHER | Age: 58
End: 2023-07-02

## 2023-07-02 NOTE — TELEPHONE ENCOUNTER
Regarding: needs colonoscopy instructions  ----- Message from Beatrice Barroso sent at 7/2/2023  8:37 AM EDT -----  " I have a colonoscopy tomorrow and I need the instructions to make sure I do this right."

## 2023-07-02 NOTE — TELEPHONE ENCOUNTER
Reason for Disposition  • Bowel prep for colonoscopy, questions about    Answer Assessment - Initial Assessment Questions  1.  DATE/TIME: "When did you have your colonoscopy?"       Can you review my bowel prep with, it's tomorrw    Protocols used: COLONOSCOPY SYMPTOMS AND QUESTIONS-ADULT-AH

## 2023-07-03 ENCOUNTER — ANESTHESIA (OUTPATIENT)
Dept: GASTROENTEROLOGY | Facility: MEDICAL CENTER | Age: 58
End: 2023-07-03

## 2023-07-03 ENCOUNTER — HOSPITAL ENCOUNTER (OUTPATIENT)
Dept: GASTROENTEROLOGY | Facility: MEDICAL CENTER | Age: 58
Setting detail: OUTPATIENT SURGERY
Discharge: HOME | End: 2023-07-03
Payer: COMMERCIAL

## 2023-07-03 ENCOUNTER — ANESTHESIA EVENT (OUTPATIENT)
Dept: GASTROENTEROLOGY | Facility: MEDICAL CENTER | Age: 58
End: 2023-07-03

## 2023-07-03 VITALS
HEIGHT: 71 IN | SYSTOLIC BLOOD PRESSURE: 101 MMHG | RESPIRATION RATE: 20 BRPM | BODY MASS INDEX: 25.9 KG/M2 | TEMPERATURE: 98.4 F | DIASTOLIC BLOOD PRESSURE: 69 MMHG | OXYGEN SATURATION: 100 % | HEART RATE: 70 BPM | WEIGHT: 185 LBS

## 2023-07-03 DIAGNOSIS — Z12.11 SPECIAL SCREENING FOR MALIGNANT NEOPLASMS, COLON: ICD-10-CM

## 2023-07-03 PROCEDURE — 45380 COLONOSCOPY AND BIOPSY: CPT | Performed by: STUDENT IN AN ORGANIZED HEALTH CARE EDUCATION/TRAINING PROGRAM

## 2023-07-03 PROCEDURE — 88305 TISSUE EXAM BY PATHOLOGIST: CPT | Performed by: PATHOLOGY

## 2023-07-03 RX ORDER — LIDOCAINE HYDROCHLORIDE 20 MG/ML
INJECTION, SOLUTION EPIDURAL; INFILTRATION; INTRACAUDAL; PERINEURAL AS NEEDED
Status: DISCONTINUED | OUTPATIENT
Start: 2023-07-03 | End: 2023-07-03

## 2023-07-03 RX ORDER — SIMETHICONE 20 MG/.3ML
EMULSION ORAL AS NEEDED
Status: COMPLETED | OUTPATIENT
Start: 2023-07-03 | End: 2023-07-03

## 2023-07-03 RX ORDER — PROPOFOL 10 MG/ML
INJECTION, EMULSION INTRAVENOUS CONTINUOUS PRN
Status: DISCONTINUED | OUTPATIENT
Start: 2023-07-03 | End: 2023-07-03

## 2023-07-03 RX ORDER — PROPOFOL 10 MG/ML
INJECTION, EMULSION INTRAVENOUS AS NEEDED
Status: DISCONTINUED | OUTPATIENT
Start: 2023-07-03 | End: 2023-07-03

## 2023-07-03 RX ORDER — SODIUM CHLORIDE 9 MG/ML
125 INJECTION, SOLUTION INTRAVENOUS CONTINUOUS
Status: DISCONTINUED | OUTPATIENT
Start: 2023-07-03 | End: 2023-07-07 | Stop reason: HOSPADM

## 2023-07-03 RX ADMIN — Medication 40 MG: at 11:17

## 2023-07-03 RX ADMIN — PROPOFOL 170 MCG/KG/MIN: 10 INJECTION, EMULSION INTRAVENOUS at 11:06

## 2023-07-03 RX ADMIN — SODIUM CHLORIDE: 0.9 INJECTION, SOLUTION INTRAVENOUS at 11:24

## 2023-07-03 RX ADMIN — SODIUM CHLORIDE 125 ML/HR: 0.9 INJECTION, SOLUTION INTRAVENOUS at 10:45

## 2023-07-03 RX ADMIN — PROPOFOL 150 MG: 10 INJECTION, EMULSION INTRAVENOUS at 11:06

## 2023-07-03 RX ADMIN — LIDOCAINE HYDROCHLORIDE 100 MG: 20 INJECTION, SOLUTION EPIDURAL; INFILTRATION; INTRACAUDAL; PERINEURAL at 11:06

## 2023-07-03 NOTE — ANESTHESIA PREPROCEDURE EVALUATION
Procedure:  COLONOSCOPY    Relevant Problems   NEURO/PSYCH   (+) KAITLIN (generalized anxiety disorder)      Other   (+) Attention deficit   (+) Tobacco user        Physical Exam    Airway    Mallampati score: I  TM Distance: >3 FB  Neck ROM: full     Dental       Cardiovascular  Rhythm: regular, Rate: normal, Cardiovascular exam normal    Pulmonary  Pulmonary exam normal     Other Findings        Anesthesia Plan  ASA Score- 2     Anesthesia Type- IV sedation with anesthesia with ASA Monitors. Additional Monitors:   Airway Plan:           Plan Factors-Exercise tolerance (METS): >4 METS. Chart reviewed. Existing labs reviewed. Patient summary reviewed. Patient is a current smoker. Patient instructed to abstain from smoking on day of procedure. Patient did not smoke on day of surgery. Obstructive sleep apnea risk education given perioperatively. Induction- intravenous. Postoperative Plan-     Informed Consent- Anesthetic plan and risks discussed with patient.

## 2023-07-03 NOTE — H&P
History and Physical -  [facility]  Kenia Cabrera 61 y.o. male MRN: [id number]          HPI: Kenia Cabrera is a 61y.o. year old male who presents for open access initial screening colonoscopy. No family history of colon cancer. REVIEW OF SYSTEMS: Per the HPI, and otherwise unremarkable. Historical Information   Past Medical History:   Diagnosis Date   • Anxiety    • Substance abuse ([address] )      History reviewed. No pertinent surgical history. Social History   Social History     Substance and Sexual Activity   Alcohol Use Not Currently     Social History     Substance and Sexual Activity   Drug Use Not Currently   • Types: Cocaine     Social History     Tobacco Use   Smoking Status Every Day   • Packs/day: 1.00   • Years: 30.00   • Total pack years: 30.00   • Types: Cigarettes   Smokeless Tobacco Never     Family History   Problem Relation Age of Onset   • Stroke Family    • Hypertension Family    • Mental illness Family        Meds/Allergies       Current Outpatient Medications:   •  bisacodyl (DULCOLAX) 5 mg EC tablet  •  buPROPion (WELLBUTRIN XL) 300 mg 24 hr tablet  •  escitalopram (LEXAPRO) 10 mg tablet  •  polyethylene glycol (GOLYTELY) 4000 mL solution  •  carbamide peroxide (DEBROX) 6.5 % otic solution  •  hydrOXYzine HCL (ATARAX) 10 mg tablet  •  sildenafil (VIAGRA) 25 MG tablet    Current Facility-Administered Medications:   •  sodium chloride 0.9 % infusion, 125 mL/hr, Intravenous, Continuous, 125 mL/hr at 07/03/23 1045    No Known Allergies    Objective     /84   Pulse 85   Temp 98.4 °F (36.9 °C) (Temporal)   Resp 20   Ht 5' 10.5" (1.791 m)   Wt 83.9 kg (185 lb)   SpO2 98%   BMI 26.17 kg/m²       PHYSICAL EXAM    GEN: NAD  CARDIO: RRR  PULM: CTA bilaterally  ABD: soft, non-tender, non-distended  EXT: no lower extremity edema  NEURO: AAOx3      ASSESSMENT/PLAN:  61y.o. year old male here for colonoscopy; he is stable and optimized for his procedure.

## 2023-07-03 NOTE — ANESTHESIA POSTPROCEDURE EVALUATION
Post-Op Assessment Note    CV Status:  Stable    Pain management: adequate     Mental Status:  Alert and awake   Hydration Status:  Euvolemic   PONV Controlled:  Controlled   Airway Patency:  Patent      Post Op Vitals Reviewed: Yes      Staff: Anesthesiologist         No notable events documented.     BP      Temp      Pulse     Resp      SpO2      /69   Pulse 70   Temp 98.4 °F (36.9 °C) (Temporal)   Resp 20   Ht 5' 10.5" (1.791 m)   Wt 83.9 kg (185 lb)   SpO2 100%   BMI 26.17 kg/m²

## 2023-07-05 ENCOUNTER — TELEPHONE (OUTPATIENT)
Dept: PSYCHIATRY | Facility: CLINIC | Age: 58
End: 2023-07-05

## 2023-07-05 ENCOUNTER — TELEPHONE (OUTPATIENT)
Dept: OTHER | Facility: OTHER | Age: 58
End: 2023-07-05

## 2023-07-05 NOTE — TELEPHONE ENCOUNTER
Left voicemail to inform patient that appt on 9/25/2023 needed to be rescheduled due to provider being out of the office. Asked patient to call the office back to reschedule.  Provider will no longer be seeing patients starting 9/20/2023

## 2023-07-22 DIAGNOSIS — R41.840 ATTENTION DEFICIT: ICD-10-CM

## 2023-07-22 DIAGNOSIS — F41.1 GAD (GENERALIZED ANXIETY DISORDER): ICD-10-CM

## 2023-07-22 DIAGNOSIS — F43.22 ADJUSTMENT DISORDER WITH ANXIOUS MOOD: ICD-10-CM

## 2023-07-24 RX ORDER — ESCITALOPRAM OXALATE 10 MG/1
10 TABLET ORAL DAILY
Qty: 30 TABLET | Refills: 1 | Status: SHIPPED | OUTPATIENT
Start: 2023-07-24

## 2023-07-24 RX ORDER — BUPROPION HYDROCHLORIDE 300 MG/1
300 TABLET ORAL EVERY MORNING
Qty: 30 TABLET | Refills: 1 | Status: SHIPPED | OUTPATIENT
Start: 2023-07-24 | End: 2023-09-22

## 2023-09-13 ENCOUNTER — TELEPHONE (OUTPATIENT)
Dept: PSYCHIATRY | Facility: CLINIC | Age: 58
End: 2023-09-13

## 2023-10-31 DIAGNOSIS — F43.22 ADJUSTMENT DISORDER WITH ANXIOUS MOOD: ICD-10-CM

## 2023-10-31 DIAGNOSIS — R41.840 ATTENTION DEFICIT: ICD-10-CM

## 2023-10-31 DIAGNOSIS — F41.1 GAD (GENERALIZED ANXIETY DISORDER): ICD-10-CM

## 2023-10-31 RX ORDER — BUPROPION HYDROCHLORIDE 300 MG/1
300 TABLET ORAL EVERY MORNING
Qty: 30 TABLET | Refills: 1 | Status: SHIPPED | OUTPATIENT
Start: 2023-10-31 | End: 2023-12-30

## 2023-10-31 RX ORDER — ESCITALOPRAM OXALATE 10 MG/1
10 TABLET ORAL DAILY
Qty: 30 TABLET | Refills: 1 | Status: SHIPPED | OUTPATIENT
Start: 2023-10-31

## 2023-11-03 ENCOUNTER — TELEPHONE (OUTPATIENT)
Dept: PSYCHIATRY | Facility: CLINIC | Age: 58
End: 2023-11-03

## 2024-01-23 DIAGNOSIS — F43.22 ADJUSTMENT DISORDER WITH ANXIOUS MOOD: ICD-10-CM

## 2024-01-23 DIAGNOSIS — R41.840 ATTENTION DEFICIT: ICD-10-CM

## 2024-01-23 DIAGNOSIS — F41.1 GAD (GENERALIZED ANXIETY DISORDER): ICD-10-CM

## 2024-01-23 RX ORDER — BUPROPION HYDROCHLORIDE 300 MG/1
300 TABLET ORAL EVERY MORNING
Qty: 30 TABLET | Refills: 1 | Status: SHIPPED | OUTPATIENT
Start: 2024-01-23 | End: 2024-03-23

## 2024-01-23 RX ORDER — ESCITALOPRAM OXALATE 10 MG/1
10 TABLET ORAL DAILY
Qty: 30 TABLET | Refills: 1 | Status: SHIPPED | OUTPATIENT
Start: 2024-01-23

## 2024-01-23 NOTE — TELEPHONE ENCOUNTER
Duane called the office to request refills on his medication.  He has an appointment with Viviana on 2/13.    Will refer to Viviana Garibay for review.

## 2024-02-13 ENCOUNTER — TELEMEDICINE (OUTPATIENT)
Dept: PSYCHIATRY | Facility: CLINIC | Age: 59
End: 2024-02-13
Payer: COMMERCIAL

## 2024-02-13 DIAGNOSIS — F41.9 ANXIETY: ICD-10-CM

## 2024-02-13 DIAGNOSIS — R41.840 ATTENTION DEFICIT: ICD-10-CM

## 2024-02-13 DIAGNOSIS — Z72.0 TOBACCO USER: ICD-10-CM

## 2024-02-13 DIAGNOSIS — F41.1 GAD (GENERALIZED ANXIETY DISORDER): Primary | ICD-10-CM

## 2024-02-13 DIAGNOSIS — F19.11 HISTORY OF SUBSTANCE ABUSE (HCC): Chronic | ICD-10-CM

## 2024-02-13 DIAGNOSIS — F43.22 ADJUSTMENT DISORDER WITH ANXIOUS MOOD: ICD-10-CM

## 2024-02-13 PROCEDURE — 90792 PSYCH DIAG EVAL W/MED SRVCS: CPT | Performed by: NURSE PRACTITIONER

## 2024-02-13 RX ORDER — ESCITALOPRAM OXALATE 10 MG/1
10 TABLET ORAL DAILY
Qty: 90 TABLET | Refills: 1 | Status: SHIPPED | OUTPATIENT
Start: 2024-02-13

## 2024-02-13 RX ORDER — HYDROXYZINE HYDROCHLORIDE 10 MG/1
10 TABLET, FILM COATED ORAL 2 TIMES DAILY PRN
Qty: 180 TABLET | Refills: 1 | Status: SHIPPED | OUTPATIENT
Start: 2024-02-13

## 2024-02-13 RX ORDER — BUPROPION HYDROCHLORIDE 300 MG/1
300 TABLET ORAL EVERY MORNING
Qty: 90 TABLET | Refills: 1 | Status: SHIPPED | OUTPATIENT
Start: 2024-02-13

## 2024-02-13 NOTE — BH TREATMENT PLAN
TREATMENT PLAN (Medication Management Only)        [facility]    Name and Date of Birth:  Duane E Grant 58 y.o. 1965  Date of Treatment Plan: February 13, 2024  Diagnosis/Diagnoses:    1. KAITLIN (generalized anxiety disorder)    2. Adjustment disorder with anxious mood    3. Anxiety    4. Attention deficit    5. History of substance abuse (HCC)    6. Tobacco user      Strengths/Personal Resources for Self-Care: supportive family, supportive friends, taking medications as prescribed, ability to adapt to life changes.  Area/Areas of need (in own words): anxiety symptoms  1. Long Term Goal: continue improvement in acceptable anxiety level.  Target Date:6 months - 8/13/2024  Person/Persons responsible for completion of goal: Duane  2.  Short Term Objective (s) - How will we reach this goal?:   A. Provider new recommended medication/dosage changes and/or continue medication(s): continue current medications as prescribed.  B. Attend medication management appointments regularly.  C. Call supportive people when needed .  Target Date:6 months - 8/13/2024  Person/Persons Responsible for Completion of Goal: Duane  Progress Towards Goals: progressing slowly  Treatment Modality: medication management every 3 months  Review due 180 days from date of this plan: 6 months - 8/13/2024  Expected length of service: ongoing treatment  My Physician/PA/NP and I have developed this plan together and I agree to work on the goals and objectives. I understand the treatment goals that were developed for my treatment.
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
## 2024-05-14 ENCOUNTER — OFFICE VISIT (OUTPATIENT)
Dept: PSYCHIATRY | Facility: CLINIC | Age: 59
End: 2024-05-14

## 2024-05-14 DIAGNOSIS — R41.840 ATTENTION DEFICIT: ICD-10-CM

## 2024-05-14 DIAGNOSIS — F41.1 GAD (GENERALIZED ANXIETY DISORDER): Primary | ICD-10-CM

## 2024-05-14 DIAGNOSIS — F19.11 HISTORY OF SUBSTANCE ABUSE (HCC): Chronic | ICD-10-CM

## 2024-05-14 RX ORDER — CHLORHEXIDINE GLUCONATE ORAL RINSE 1.2 MG/ML
SOLUTION DENTAL
COMMUNITY
Start: 2024-03-27

## 2024-05-14 RX ORDER — AMOXICILLIN 500 MG/1
CAPSULE ORAL 3 TIMES DAILY
COMMUNITY
Start: 2024-03-27

## 2024-05-14 RX ORDER — IBUPROFEN 600 MG/1
600 TABLET ORAL EVERY 6 HOURS PRN
COMMUNITY
Start: 2024-03-27

## 2024-05-14 NOTE — PSYCH
MEDICATION MANAGEMENT NOTE        [facility]    Name and Date of Birth:  Duane E Grant 58 y.o. 1965 MRN: [id number]    Date of Visit: May 14, 2024    Visit Time    Visit Start Time: 1530   Visit Stop Time: 1600  Total Visit Duration:  30 minutes     The total visit duration detailed above includes: patient engagement, medication management, psychotherapy/counseling, discussion regarding treatment goals, and coordination of care.      Note Share Disclaimer:     This note was not shared with the patient due to reasonable likelihood of causing patient harm    No Known Allergies  SUBJECTIVE:    Duane is seen today for a follow up for depression and Generalized Anxiety Disorder. He continues to do relatively well since the last visit. States work is going well, he is sleeping most nights. Will occasionally use hydroxyzine for sleep appropriately. Still attends NA meetings several times a week. States things are going pretty good at work. Currently living alone and not in any relationship but feels fulfilled in life.  Was recently to [place] to visit with family, mother's health declines.  He plans on returning again this summer on vacation.  He would like to continue medications the same for now.     He denies any side effects from medications.      HPI ROS Appetite Changes and Sleep:     He reports fluctuating sleep pattern, fluctuating appetite, fluctuating energy levels. Denies homicidal ideation, denies suicidal ideation    Review Of Systems:   no complaints, all other systems are negative         Mental Status Evaluation:    Appearance:  age appropriate   Behavior:  pleasant, cooperative   Speech:  normal rate, normal volume   Mood:  depressed   Affect:  flat   Thought Process:  goal directed   Associations: intact associations   Thought Content:  no overt delusions   Perceptual Disturbances: none   Risk Potential: Suicidal ideation - None  Homicidal  ideation - None  Potential for aggression - No   Sensorium:  oriented to person, place, and time/date   Memory:  recent and remote memory grossly intact   Consciousness:  alert and awake   Attention: attention span and concentration are age appropriate   Insight:  improving   Judgment: improving   Gait/Station: normal gait/station, normal balance   Motor Activity: no abnormal movements       History Review:The following portions of the patient's history were reviewed and updated as appropriate: psychiatric history, trauma history allergies, current medications, past family history, past medical history, past social history, past surgical history, and problem list   OBJECTIVE:     Vital signs in last 24 hours:    There were no vitals filed for this visit.  Laboratory Results: I have personally reviewed all pertinent laboratory/tests results.      Suicide/Homicide Risk Assessment:    The following interventions are recommended: no intervention changes needed. Although patient's acute lethality risk is low, long-term/chronic lethality risk is mildly elevated in the presence of ongoing anxiety. At the current moment, Duane is future-oriented, forward-thinking, and demonstrates ability to act in a self-preserving manner as evidenced by volitionally presenting to the clinic today, seeking treatment. To mitigate future risk, patient should adhere to the recommendations below, avoid alcohol/illicit substance use, utilize community-based resources and familiar support and prioritize mental health treatment. Presently, patient denies active suicidal/homicidal ideation in addition to thoughts of self-injury; contracts for safety.  At conclusion of evaluation, patient is amenable to the recommendations below. Patient is amenable to calling/contacting the outpatient office including this writer if any acute adverse effects of their medication regimen arise in addition to any comments or concerns pertaining to their psychiatric  management.  Patient is amenable to calling/contacting crisis and/or attending to the nearest emergency department if their clinical condition deteriorates to assure their safety and stability, stating that they are able to appropriately confide in their family and friends regarding their psychiatric state.    Assessment/Plan:     PLAN:  Diagnoses and all orders for this visit:    KAITLIN (generalized anxiety disorder)    History of substance abuse (HCC)    Attention deficit    Other orders  -     amoxicillin (AMOXIL) 500 mg capsule; Take by mouth 3 (three) times a day For 7 days  -     chlorhexidine (PERIDEX) 0.12 % solution; SWISH AND SPIT 15 ML TWICE A DAY FOR 1 WEEK  -     ibuprofen (MOTRIN) 600 mg tablet; Take 600 mg by mouth every 6 (six) hours as needed         Treatment Recommendations/Precautions:    Continue Wellbutrin  mg for depression.  Continue Lexapro 10 mg daily for depression and anxiety  Continue hydroxyzine as needed for anxiety or sleep  He does not feel he needs individual therapy at this time, he does still attends NA meetings and still speaks regularly with his sponsor  Medication management every 3 months  Aware of 24 hour and weekend coverage for urgent situations accessed by calling [facility] main practice number  Patient advised to call 911 if feeling suicidal or homicidal before acting out on their thoughts and they expressed understanding.    Medications Risks/Benefits      Risks, Benefits And Possible Side Effects Of Medications:    Discussed risks and benefits of treatment with patient including risk of suicidality, serotonin syndrome, increased QTc interval and SIADH related to treatment with antidepressants; Risk of induction of manic symptoms in certain patient populations         Controlled Medication Discussion:     Not applicable - controlled prescriptions are not prescribed by this practice    Psychotherapy Provided:     Individual psychotherapy  provided: Medications, treatment progress and treatment plan reviewed with Duane.  Medication education provided to Duane.  Reassurance and supportive therapy provided.   Crisis/safety plan discussed with Duane.     Treatment Plan:    Completed and signed during the session: Yes - with Duane    This note was not shared with the patient due to reasonable likelihood of causing patient harm      AUNDREA Haynes 05/14/24

## 2024-06-03 ENCOUNTER — OFFICE VISIT (OUTPATIENT)
Dept: FAMILY MEDICINE CLINIC | Facility: CLINIC | Age: 59
End: 2024-06-03
Payer: COMMERCIAL

## 2024-06-03 VITALS
RESPIRATION RATE: 16 BRPM | HEART RATE: 83 BPM | BODY MASS INDEX: 26.49 KG/M2 | SYSTOLIC BLOOD PRESSURE: 124 MMHG | WEIGHT: 189.2 LBS | HEIGHT: 71 IN | TEMPERATURE: 97.5 F | DIASTOLIC BLOOD PRESSURE: 84 MMHG | OXYGEN SATURATION: 97 %

## 2024-06-03 DIAGNOSIS — K63.5 POLYP OF COLON, UNSPECIFIED PART OF COLON, UNSPECIFIED TYPE: ICD-10-CM

## 2024-06-03 DIAGNOSIS — Z72.0 TOBACCO USER: ICD-10-CM

## 2024-06-03 DIAGNOSIS — Z23 NEED FOR VACCINATION: ICD-10-CM

## 2024-06-03 DIAGNOSIS — E55.9 VITAMIN D DEFICIENCY: ICD-10-CM

## 2024-06-03 DIAGNOSIS — F17.211 NICOTINE DEPENDENCE, CIGARETTES, IN REMISSION: ICD-10-CM

## 2024-06-03 DIAGNOSIS — Z12.5 SCREENING FOR PROSTATE CANCER: ICD-10-CM

## 2024-06-03 DIAGNOSIS — Z13.1 SCREENING FOR DIABETES MELLITUS: ICD-10-CM

## 2024-06-03 DIAGNOSIS — R35.1 NOCTURIA: ICD-10-CM

## 2024-06-03 DIAGNOSIS — F41.1 GAD (GENERALIZED ANXIETY DISORDER): ICD-10-CM

## 2024-06-03 DIAGNOSIS — Z00.00 ANNUAL PHYSICAL EXAM: Primary | ICD-10-CM

## 2024-06-03 PROCEDURE — 99213 OFFICE O/P EST LOW 20 MIN: CPT | Performed by: FAMILY MEDICINE

## 2024-06-03 PROCEDURE — 99396 PREV VISIT EST AGE 40-64: CPT | Performed by: FAMILY MEDICINE

## 2024-06-03 NOTE — PROGRESS NOTES
Adult Annual Physical  Name: Duane E Grant      : 1965      MRN: [id number]  Encounter Provider: Lori Pagan MD  Encounter Date: 6/3/2024   Encounter department: [facility]    Assessment & Plan   1. Annual physical exam  -     Lipid panel; Future; Expected date: 2024  -     Comprehensive metabolic panel; Future; Expected date: 2024  -     UA w Reflex to Microscopic w Reflex to Culture; Future  2. KAITLIN (generalized anxiety disorder)  -     TSH, 3rd generation with Free T4 reflex; Future; Expected date: 2024  3. Tobacco user  -     Complete PFT with post bronchodilator; Future  4. Nicotine dependence, cigarettes, in remission  -     CT lung screening program; Future; Expected date: 2024  -     Complete PFT with post bronchodilator; Future  5. Vitamin D deficiency  -     Vitamin D 25 hydroxy; Future; Expected date: 2024  6. Polyp of colon, unspecified part of colon, unspecified type  7. Nocturia  -     US kidney and bladder with pvr; Future; Expected date: 2024  8. Screening for prostate cancer  -     PSA, Total Screen; Future  9. Screening for diabetes mellitus  -     Hemoglobin A1C; Future; Expected date: 2024  -     Comprehensive metabolic panel; Future; Expected date: 2024  10. BMI 26.0-26.9,adult  11. Need for vaccination  -     RSV Pre-Fusion F A&B Vac Rcmb (Abrysvo) SOLR vaccine; Inject 0.5 mL into a muscle once for 1 dose      Regarding his annual physical patient is given age and diagnosis appropriate evaluation and care.  Patient is ordered the blood work and urine above including a PSA for his prostate cancer screening.  Patient will also be screened for diabetes with his next blood work.  Patient advised to go see the GI again in about 2 years for his follow-up colonoscopy.  Patient is sent RSV vaccine now and discussed with patient he may want to get it especially when he turns 59 in July.  Patient also advised to  take a multivitamin and vitamin D3 2 to 3000 units daily.  Regarding his anxiety, discussed with patient.  Patient denies SI HI.  Patient will continue his medication.  And patient will be seeing a therapist soon as well.  Will continue to monitor.  Regarding his smoking, discussed with patient.  Patient says that he recently quit.  Patient education.  Patient is ordered a CT of the lung as above and it was discussed with patient as well.  Patient also ordered a PFT.  Will continue to monitor.  Regarding his vitamin D deficiency patient vies to supplement.  Will check his labs also.  Regarding his colon polyp history, discussed with patient.  Patient advised to follow-up with GI in about 2 years and is due again for another colonoscopy.  Regarding his nocturia, discussed with patient.  Patient is ordered the above blood work which includes a PSA and a urinalysis.  Patient is also ordered a bladder and renal sonogram as above.  Patient to see a urologist after if need be as well.  Regarding his weight, discussed with patient.  Patient advised to continue not smoking anymore.  Diet and exercise.  Will check his labs also.  RTO 1 year for his annual physical and do the blood work and urine before.  As advised and I discussed with patient that patient can see me prior to that for anything else in between.          Immunizations and preventive care screenings were discussed with patient today. Appropriate education was printed on patient's after visit summary.        Counseling:  Alcohol/drug use: discussed moderation in alcohol intake, the recommendations for healthy alcohol use, and avoidance of illicit drug use.  Dental Health: discussed importance of regular tooth brushing, flossing, and dental visits.  Injury prevention: discussed safety/seat belts, safety helmets, smoke detectors, carbon dioxide detectors, and smoking near bedding or upholstery.  Sexual health: discussed sexually transmitted diseases, partner  selection, use of condoms, avoidance of unintended pregnancy, and contraceptive alternatives.  Exercise: the importance of regular exercise/physical activity was discussed. Recommend exercise 3-5 times per week for at least 30 minutes.       Depression Screening and Follow-up Plan: Patient was screened for depression during today's encounter. They screened negative with a PHQ-2 score of 0.    Tobacco Cessation Counseling: Tobacco cessation counseling was provided. The patient is sincerely urged to quit consumption of tobacco. He is ready to quit tobacco. Patient asserts that he recently quit tobacco.    Lung Cancer Screening Shared Decision Making: I discussed with him that he is a candidate for lung cancer CT screening.     The following Shared Decision-Making points were covered:  Benefits of screening were discussed, including the rates of reduction in death from lung cancer and other causes.  Harms of screening were reviewed, including false positive tests, radiation exposure levels, risks of invasive procedures, risks of complications of screening, and risk of overdiagnosis.  I counseled on the importance of adherence to annual lung cancer LDCT screening, impact of co-morbidities, and ability or willingness to undergo diagnosis and treatment.  I counseled on the importance of maintaining abstinence as a former smoker or was counseled on the importance of smoking cessation if a current smoker    Review of Eligibility Criteria: He meets all of the criteria for Lung Cancer Screening.   - He is 58 y.o.   - He has 20 pack year tobacco history and is a current smoker or has quit within the past 15 years  - He presents no signs or symptoms of lung cancer    After discussion, the patient decided to elect lung cancer screening.        History of Present Illness     Adult Annual Physical:  Patient presents for annual physical. 58-year-old male here for his annual physical.  Patient also like to be evaluated for his  anxiety and the fact that he recently quit tobacco.  Patient is up-to-date with his colonoscopy and he had colon polyps for which she needs another 1 in about 2 years from now.  Patient does have nocturia.  Patient will be seeing a therapist soon for his anxiety as well.  Patient denies SI HI.  Patient is up-to-date with his vaccines and will be due for the RSV vaccine after he turns 59 in July.  No history of any reactions to vaccines..     Diet and Physical Activity:  - Diet/Nutrition: poor diet.  - Exercise: 1-2 times a week on average.    Depression Screening:  - PHQ-2 Score: 0    General Health:  - Sleep: sleeps well.  - Hearing: normal hearing bilateral ears.  - Vision: no vision problems. Though he may need reading glasses as per patient.  - Dental: no dental visits for > 1 year.     Health:  - History of STDs: no.   - Urinary symptoms: nocturia.     Advanced Care Planning:  - Has an advanced directive?: no    - Has a durable medical POA?: no    - ACP document given to patient?: no      Review of Systems   Constitutional:  Negative for activity change, appetite change, chills, fatigue, fever and unexpected weight change.   HENT:  Negative for congestion, hearing loss and sore throat.    Eyes:  Negative for visual disturbance.   Respiratory:  Negative for cough and shortness of breath.    Cardiovascular:  Negative for chest pain and palpitations.   Gastrointestinal:  Negative for abdominal pain, blood in stool, constipation, diarrhea, nausea and vomiting.   Genitourinary:  Negative for dysuria and hematuria.   Musculoskeletal:  Negative for arthralgias.   Skin:  Negative for rash.   Neurological:  Negative for dizziness and headaches.   Psychiatric/Behavioral:  Positive for dysphoric mood. Negative for self-injury, sleep disturbance and suicidal ideas. The patient is nervous/anxious.          Objective     /84 (BP Location: Left arm, Patient Position: Sitting, Cuff Size: Adult)   Pulse 83   Temp 97.5  "°F (36.4 °C) (Temporal)   Resp 16   Ht 5' 10.5\" (1.791 m)   Wt 85.8 kg (189 lb 3.2 oz)   SpO2 97%   BMI 26.76 kg/m²     Physical Exam  Vitals reviewed.   Constitutional:       General: He is not in acute distress.     Appearance: Normal appearance. He is not ill-appearing.   HENT:      Head: Normocephalic.      Right Ear: Tympanic membrane, ear canal and external ear normal.      Left Ear: Tympanic membrane, ear canal and external ear normal.      Mouth/Throat:      Mouth: Mucous membranes are moist.      Pharynx: Oropharynx is clear.   Eyes:      Extraocular Movements: Extraocular movements intact.      Conjunctiva/sclera: Conjunctivae normal.      Pupils: Pupils are equal, round, and reactive to light.   Neck:      Vascular: No carotid bruit.   Cardiovascular:      Rate and Rhythm: Normal rate and regular rhythm.   Pulmonary:      Effort: Pulmonary effort is normal.      Breath sounds: Normal breath sounds.   Abdominal:      General: Bowel sounds are normal.      Palpations: Abdomen is soft.      Tenderness: There is no abdominal tenderness. There is no right CVA tenderness or left CVA tenderness.   Musculoskeletal:         General: No tenderness.      Cervical back: Neck supple.      Right lower leg: No edema.      Left lower leg: No edema.      Comments: No calf tenderness bilateral.   Lymphadenopathy:      Cervical: No cervical adenopathy.   Skin:     General: Skin is warm.      Findings: No rash.   Neurological:      General: No focal deficit present.      Mental Status: He is alert and oriented to person, place, and time.   Psychiatric:         Mood and Affect: Mood normal.         Behavior: Behavior normal.         Thought Content: Thought content normal.      Comments: Patient anxiety and depression discussed with patient.  Patient briefly counseled.  Patient advised to follow-up with his therapist.  Denies SI HI.       Administrative Statements         "

## 2024-08-13 ENCOUNTER — OFFICE VISIT (OUTPATIENT)
Dept: PSYCHIATRY | Facility: CLINIC | Age: 59
End: 2024-08-13
Payer: COMMERCIAL

## 2024-08-13 DIAGNOSIS — R41.840 ATTENTION DEFICIT: ICD-10-CM

## 2024-08-13 DIAGNOSIS — F43.22 ADJUSTMENT DISORDER WITH ANXIOUS MOOD: ICD-10-CM

## 2024-08-13 DIAGNOSIS — F41.1 GAD (GENERALIZED ANXIETY DISORDER): ICD-10-CM

## 2024-08-13 PROCEDURE — 99214 OFFICE O/P EST MOD 30 MIN: CPT | Performed by: NURSE PRACTITIONER

## 2024-08-13 RX ORDER — ESCITALOPRAM OXALATE 10 MG/1
10 TABLET ORAL DAILY
Qty: 90 TABLET | Refills: 1 | Status: SHIPPED | OUTPATIENT
Start: 2024-08-13

## 2024-08-13 RX ORDER — BUPROPION HYDROCHLORIDE 300 MG/1
300 TABLET ORAL EVERY MORNING
Qty: 90 TABLET | Refills: 1 | Status: SHIPPED | OUTPATIENT
Start: 2024-08-13

## 2024-08-13 NOTE — PSYCH
"MEDICATION MANAGEMENT NOTE        [facility]    Name and Date of Birth:  Duane E Grant 59 y.o. 1965 MRN: [id number]    Date of Visit: August 13, 2024    Visit Time    Visit Start Time: 1530   Visit Stop Time: 1600  Total Visit Duration:  30 minutes     The total visit duration detailed above includes: patient engagement, medication management, psychotherapy/counseling, discussion regarding treatment goals, and coordination of care.      Note Share Disclaimer:     This note was not shared with the patient due to reasonable likelihood of causing patient harm    No Known Allergies  SUBJECTIVE:    Duane is seen today for a follow up for Major Depressive Disorder and Generalized Anxiety Disorder. He continues to experience on and off anxiety symptoms since the last visit.  He reports he continues to have episodes of increased anxiety and \"near panic \".  States however, he is now better able to redirect his thoughts and symptoms are not long-lasting.  States he noted especially some increase in anxiety when he traveled to [facility] in [place] and forgot his medications.  He has not needed as needed hydroxyzine.  He continues on Lexapro 10 mg daily and Wellbutrin  mg daily.  We talked about possibly trialing a reduction of his Lexapro -to see if he could eventually tolerate monotherapy for his anxiety and depression since he has been stable for an extended period of time.  He is anxious about this change and will consider it.  No suicidal thoughts or passive death wish.  We will follow-up in 3 months.    He denies any side effects from current psychiatric medications.      HPI ROS Appetite Changes and Sleep:     He reports fluctuating sleep pattern, fluctuating appetite, fluctuating energy levels. Denies homicidal ideation, denies suicidal ideation    Review Of Systems:   no complaints, all other systems are negative         Mental Status " Evaluation:    Appearance:  age appropriate   Behavior:  pleasant, cooperative   Speech:  normal rate, normal volume   Mood:  depressed   Affect:  flat   Thought Process:  goal directed   Associations: intact associations   Thought Content:  no overt delusions   Perceptual Disturbances: none   Risk Potential: Suicidal ideation - None  Homicidal ideation - None  Potential for aggression - No   Sensorium:  oriented to person, place, and time/date   Memory:  recent and remote memory grossly intact   Consciousness:  alert and awake   Attention: decreased concentration and decreased attention span   Insight:  limited   Judgment: limited   Gait/Station: normal gait/station, normal balance   Motor Activity: no abnormal movements       History Review:The following portions of the patient's history were reviewed and updated as appropriate: psychiatric history, trauma history allergies, current medications, past family history, past medical history, past social history, past surgical history, and problem list   OBJECTIVE:     Vital signs in last 24 hours:    There were no vitals filed for this visit.  Laboratory Results: I have personally reviewed all pertinent laboratory/tests results.      Suicide/Homicide Risk Assessment:    The following interventions are recommended: no intervention changes needed. Although patient's acute lethality risk is low, long-term/chronic lethality risk is mildly elevated in the presence of anxiety symptoms. At the current moment, Duane is future-oriented, forward-thinking, and demonstrates ability to act in a self-preserving manner as evidenced by volitionally presenting to the clinic today, seeking treatment. To mitigate future risk, patient should adhere to the recommendations below, avoid alcohol/illicit substance use, utilize community-based resources and familiar support and prioritize mental health treatment. Presently, patient denies active suicidal/homicidal ideation in addition to  thoughts of self-injury; contracts for safety.  At conclusion of evaluation, patient is amenable to the recommendations below. Patient is amenable to calling/contacting the outpatient office including this writer if any acute adverse effects of their medication regimen arise in addition to any comments or concerns pertaining to their psychiatric management.  Patient is amenable to calling/contacting crisis and/or attending to the nearest emergency department if their clinical condition deteriorates to assure their safety and stability, stating that they are able to appropriately confide in their family and friends regarding their psychiatric state.    Assessment/Plan:     PLAN:  Diagnoses and all orders for this visit:    Adjustment disorder with anxious mood  -     escitalopram (LEXAPRO) 10 mg tablet; Take 1 tablet (10 mg total) by mouth daily    KAITLIN (generalized anxiety disorder)  -     escitalopram (LEXAPRO) 10 mg tablet; Take 1 tablet (10 mg total) by mouth daily    Attention deficit  -     buPROPion (WELLBUTRIN XL) 300 mg 24 hr tablet; Take 1 tablet (300 mg total) by mouth every morning         Treatment Recommendations/Precautions:    He still attends NA meetings, has maintained sobriety for 5 years  Continue Wellbutrin  mg daily for mood  Continue Lexapro 10 mg daily for anxiety  Has not been needing as needed hydroxyzine  Does not feel he needs individual therapist at this time.  Medication management with psychotherapy every 3 months  Aware of 24 hour and weekend coverage for urgent situations accessed by calling [facility] main practice number  Patient advised to call 911 if feeling suicidal or homicidal before acting out on their thoughts and they expressed understanding.    Medications Risks/Benefits      Risks, Benefits And Possible Side Effects Of Medications:    Discussed risks and benefits of treatment with patient including risk of suicidality, serotonin syndrome,  increased QTc interval and SIADH related to treatment with antidepressants; Risk of induction of manic symptoms in certain patient populations         Controlled Medication Discussion:     Not applicable - controlled prescriptions are not prescribed by this practice    Psychotherapy Provided:     Individual psychotherapy provided: Medications, treatment progress and treatment plan reviewed with Duane.  Medication changes discussed with Duane.  Medication education provided to Duane.  Reassurance and supportive therapy provided.      Treatment Plan:    Completed and signed during the session: Yes - with Duane    This note was not shared with the patient due to reasonable likelihood of causing patient harm      AUNDREA Haynes 08/13/24

## 2024-08-14 NOTE — BH TREATMENT PLAN
TREATMENT PLAN (Medication Management Only)        [facility]    Name and Date of Birth:  Duane E Grant 59 y.o. 1965  Date of Treatment Plan: August 13, 2024  Diagnosis/Diagnoses:    1. Adjustment disorder with anxious mood    2. KAITLIN (generalized anxiety disorder)    3. Attention deficit      Strengths/Personal Resources for Self-Care: supportive family, supportive friends, taking medications as prescribed.  Area/Areas of need (in own words): anxiety symptoms, depressive symptoms  1. Long Term Goal: continue improvement in acceptable anxiety level.  Target Date:6 months - 2/14/2025  Person/Persons responsible for completion of goal: Duane  2.  Short Term Objective (s) - How will we reach this goal?:   A. Provider new recommended medication/dosage changes and/or continue medication(s): continue current medications as prescribed.  B. Attend medication management appointments regularly.  C. Attend AA meetings regularly.  Target Date:6 months - 2/14/2025  Person/Persons Responsible for Completion of Goal: Duane  Progress Towards Goals: continuing treatment  Treatment Modality: medication management every 3 months  Review due 180 days from date of this plan: 6 months - 2/14/2025  Expected length of service: ongoing treatment  My Physician/PA/NP and I have developed this plan together and I agree to work on the goals and objectives. I understand the treatment goals that were developed for my treatment.

## 2025-01-20 ENCOUNTER — TELEPHONE (OUTPATIENT)
Age: 60
End: 2025-01-20

## 2025-01-20 NOTE — TELEPHONE ENCOUNTER
Patient contacted the office to schedule a follow up visit with provider. Patient is now scheduled for 2/4/25  at 1pm in office.

## 2025-02-04 ENCOUNTER — OFFICE VISIT (OUTPATIENT)
Dept: PSYCHIATRY | Facility: CLINIC | Age: 60
End: 2025-02-04
Payer: COMMERCIAL

## 2025-02-04 DIAGNOSIS — F41.1 GAD (GENERALIZED ANXIETY DISORDER): Primary | ICD-10-CM

## 2025-02-04 DIAGNOSIS — F43.22 ADJUSTMENT DISORDER WITH ANXIOUS MOOD: ICD-10-CM

## 2025-02-04 DIAGNOSIS — R41.840 ATTENTION DEFICIT: ICD-10-CM

## 2025-02-04 DIAGNOSIS — F19.11 HISTORY OF SUBSTANCE ABUSE (HCC): Chronic | ICD-10-CM

## 2025-02-04 PROCEDURE — 99214 OFFICE O/P EST MOD 30 MIN: CPT | Performed by: NURSE PRACTITIONER

## 2025-02-04 RX ORDER — ESCITALOPRAM OXALATE 10 MG/1
10 TABLET ORAL DAILY
Qty: 90 TABLET | Refills: 1 | Status: SHIPPED | OUTPATIENT
Start: 2025-02-04 | End: 2025-02-04

## 2025-02-04 RX ORDER — BUPROPION HYDROCHLORIDE 300 MG/1
300 TABLET ORAL EVERY MORNING
Qty: 90 TABLET | Refills: 1 | Status: SHIPPED | OUTPATIENT
Start: 2025-02-04

## 2025-02-04 RX ORDER — ESCITALOPRAM OXALATE 10 MG/1
10 TABLET ORAL DAILY
Qty: 90 TABLET | Refills: 1 | Status: SHIPPED | OUTPATIENT
Start: 2025-02-04

## 2025-02-04 NOTE — PSYCH
MEDICATION MANAGEMENT NOTE        [facility]    Name and Date of Birth:  Duane E Grant 59 y.o. 1965 MRN: [id number]    Date of Visit: February 4, 2025    Reason for Visit: Psychiatric medication management follow-up visit    No Known Allergies      Assessment & Plan  KAITLIN (generalized anxiety disorder)    Orders:    escitalopram (LEXAPRO) 10 mg tablet; Take 1 tablet (10 mg total) by mouth daily    History of substance abuse (HCC)  Stable, he attends AA/NA meetings       Attention deficit    Orders:    buPROPion (WELLBUTRIN XL) 300 mg 24 hr tablet; Take 1 tablet (300 mg total) by mouth every morning    Adjustment disorder with anxious mood    Orders:    escitalopram (LEXAPRO) 10 mg tablet; Take 1 tablet (10 mg total) by mouth daily           SUBJECTIVE:    Duane is seen today for a follow up for Major Depressive Disorder, Generalized Anxiety Disorder, and ADHD. He continues to experience on and off symptoms since the last visit.  His father passed away since our last visit.  He states it has been a tough time, he has noted some sadness that can linger for days at a time.  However, he continues to feel that overall he is doing well on current medications.  Does not wish to try any reduction at this time.  He will occasionally use hydroxyzine to help with sleep.  Concentration and focus are helped with the Wellbutrin.  Continues to struggle with social anxiety disorder, does not want individual therapy at this time.    He denies any side effects from current psychiatric medications.      HPI ROS Appetite Changes and Sleep:     He reports fluctuating sleep pattern, fluctuating appetite, fluctuating energy levels. Denies homicidal ideation, denies suicidal ideation    Review Of Systems:   no complaints, all other systems are negative , except as noted above         Mental Status Evaluation:    Appearance:  age appropriate   Behavior:  pleasant, cooperative   Speech:   normal rate, normal volume   Mood:  improved   Affect:  brighter   Thought Process:  goal directed   Associations: intact associations, perseveration   Thought Content:  no overt delusions, obsessive thoughts, negative thoughts   Perceptual Disturbances: denies auditory hallucinations when asked   Risk Potential: Suicidal ideation - None  Homicidal ideation - None  Potential for aggression - No   Sensorium:  oriented to person, place, and time/date   Memory:  recent and remote memory grossly intact   Consciousness:  alert and awake   Attention: attention span and concentration appear shorter than expected for age   Insight:  fair   Judgment: improving   Gait/Station: normal gait/station, normal balance   Motor Activity: no abnormal movements       History Review:The following portions of the patient's history were reviewed and updated as appropriate: psychiatric history, trauma history allergies, current medications, past family history, past medical history, past social history, past surgical history, and problem list   OBJECTIVE:     Vital signs in last 24 hours:    There were no vitals filed for this visit.  Laboratory Results: I have personally reviewed all pertinent laboratory/tests results.      Treatment Recommendations/Precautions:    Continue Wellbutrin  mg daily for attention and focus  Continue Lexapro 10 mg daily for depression and anxiety  Continue hydroxyzine 10 mg for sleep or anxiety.  Aware of need to follow up with family physician for medical issues  Aware of 24 hour and weekend coverage for urgent situations accessed by calling [facility] main practice number  Medication management every 3 months  Patient advised to call 911 if feeling suicidal or homicidal before acting out on their thoughts and they expressed understanding.    Medications Risks/Benefits      Risks, Benefits And Possible Side Effects Of Medications:    Discussed risks and benefits of treatment with  patient including risk of suicidality, serotonin syndrome, increased QTc interval and SIADH related to treatment with antidepressants; Risk of induction of manic symptoms in certain patient populations         Controlled Medication Discussion:     No recent records found for controlled prescriptions according to [place] Prescription Drug Monitoring Program  Not applicable - controlled prescriptions are not prescribed by this practice    Psychotherapy Provided:     Individual psychotherapy provided: Medications, treatment progress and treatment plan reviewed with Duane.  Medication changes discussed with Duane.  Medication education provided to Duane.  Reassurance and supportive therapy provided.      Treatment Plan:    Completed and signed during the session: Not applicable - Treatment Plan not due at this session      Total Visit Duration:  30 minutes     The total visit duration detailed above includes: patient engagement, medication management, psychotherapy/counseling, discussion regarding treatment goals, and coordination of care.      Note Share Disclaimer:     This note was not shared with the patient due to this is a psychotherapy note    AUNDREA Haynes 02/04/25    This note was completed in part utilizing Dragon dictation Software. Grammatical, translation, syntax errors, random word insertions, spelling mistakes, and incomplete sentences may be an occasional consequence of this system secondary to software limitations with voice recognition, ambient noise, and hardware issues. If you have any questions or concerns about the content, text, or information contained within the body of this dictation, please contact the provider for clarification.

## 2025-02-04 NOTE — ASSESSMENT & PLAN NOTE
Orders:    buPROPion (WELLBUTRIN XL) 300 mg 24 hr tablet; Take 1 tablet (300 mg total) by mouth every morning

## 2025-02-07 NOTE — TELEPHONE ENCOUNTER
I called and spoke with patient regarding 7/3 procedure  Confirmed with patient to send bowel prep to [facility] in [place] on [place]  Patient made aware to  medication and to review prep instructions sent via [address]  Patient also made aware to have a  for procedure and that lab will call the Thursday prior with arrival time  : Yes

## 2025-05-06 ENCOUNTER — OFFICE VISIT (OUTPATIENT)
Dept: PSYCHIATRY | Facility: CLINIC | Age: 60
End: 2025-05-06
Payer: COMMERCIAL

## 2025-05-06 DIAGNOSIS — F41.1 GAD (GENERALIZED ANXIETY DISORDER): Primary | ICD-10-CM

## 2025-05-06 DIAGNOSIS — F19.11 HISTORY OF SUBSTANCE ABUSE (HCC): Chronic | ICD-10-CM

## 2025-05-06 DIAGNOSIS — R41.840 ATTENTION DEFICIT: ICD-10-CM

## 2025-05-06 PROCEDURE — 99214 OFFICE O/P EST MOD 30 MIN: CPT | Performed by: NURSE PRACTITIONER

## 2025-05-06 RX ORDER — BUPROPION HYDROCHLORIDE 300 MG/1
300 TABLET ORAL EVERY MORNING
Qty: 90 TABLET | Refills: 1 | Status: CANCELLED | OUTPATIENT
Start: 2025-05-06

## 2025-05-07 NOTE — BH TREATMENT PLAN
TREATMENT PLAN (Medication Management Only)        [facility]    Name and Date of Birth:  Duane E Grant 59 y.o. 1965  MRN: [id number]  Date of Treatment Plan: May 6, 2025  Diagnosis/Diagnoses:    1. KAITLIN (generalized anxiety disorder)    2. Attention deficit    3. History of substance abuse (HCC)      Strengths/Personal Resources for Self-Care: supportive family, supportive friends, taking medications as prescribed.  Area/Areas of need (in own words): anxiety symptoms  1. Long Term Goal:   continue improvement in acceptable anxiety level.  Target Date:6 months - 11/12/2025  Person/Persons responsible for completion of goal: Duane  2.  Short Term Objective (s) - How will we reach this goal?:   A.  Provider new recommended medication/dosage changes and/or continue medication(s): continue current medications as prescribed.  B.  Attend medication management appointments regularly..  C.  Attend support groups. Call family when needed..  Target Date:6 months - 11/12/2025  Person/Persons Responsible for Completion of Goal: Duane  Progress Towards Goals: continuing treatment  Treatment Modality: medication management every 3 months  Review due 180 days from date of this plan: 6 months - 11/12/2025  Expected length of service: ongoing treatment unless revised  My Physician/PA/NP and I have developed this plan together and I agree to work on the goals and objectives. I understand the treatment goals that were developed for my treatment.   Electronic Signatures: on file (unless signed below)    AUNDREA Haynes 05/07/25

## 2025-05-07 NOTE — PSYCH
MEDICATION MANAGEMENT NOTE    Name: Duane E Grant      : 1965      MRN: [id number]  Encounter Provider: AUNDREA Haynes  Encounter Date: 2025   Encounter department: [facility]    Insurance: Payor: [employer] / Plan: [employer] / Product Type: PPO Commercial /      Reason for Visit: Medication management follow-up visit  Assessment & Plan  Attention deficit  Continue Wellbutrin  mg       History of substance abuse (HCC)         KAITLIN (generalized anxiety disorder)  Continue Lexapro 10 mg           Treatment Recommendations:    Educated about diagnosis and treatment modalities. Verbalizes understanding and agreement with the treatment plan.  Discussed self monitoring of symptoms, and symptom monitoring tools.  Discussed medications and if treatment adjustment was needed or desired.  Medication management every 3 months  Aware of 24 hour and weekend coverage for urgent situations accessed by calling [facility] main practice number  I am scheduling this patient out for greater than 3 months: No    Medications Risks/Benefits:      Risks, Benefits And Possible Side Effects Of Medications:    Risks, benefits, and possible side effects of medications explained to Duane and he (or legal representative) verbalizes understanding and agreement for treatment.    Controlled Medication Discussion:     No recent records found for controlled prescriptions according to [place] Prescription Drug Monitoring Program.      History of Present Illness     CC: Duane presents today for follow up on his anxiety     Duane continues to deny depressed mood.  Continues to struggle with anxiety, generalized anxiety and social anxiety symptoms.  Dealing better with the loss of his father.  Visits his family in [place] often.  Still active in 12-step program and has prolonged sobriety.  He continues reluctant for any medication  "changes wishes to continue Wellbutrin and Lexapro the same.  Does feel these medications manage his anxiety \"when I remember to take them \".  Provided education on compliance and we discussed tools to help him remember to take his medications.    Med Compliance: Variable as above, but he believes about 75% of the time he is compliant    Since our last visit, overall symptoms have been stable.       HPI ROS:     Medication Side Effects: None  Depression: 2/10 (10 worst)  Anxiety: 4/10 (10 worst)  Safety concerns (SI, HI, others): Denies any thoughts of harming self or others  Sleep: Fair  Energy: Fair  Appetite: Fair  Weight Change: None    Duane denies any side effects from medications unless noted above.    Review Of Systems: A review of systems is obtained and is negative except for the pertinent positives listed in HPI/Subjective above.      Current Rating Scores:     Current PHQ-9   PHQ-2/9 Depression Screening           Current KAITLIN-7     Areas of Improvement: reviewed in HPI/Subjective Section and reviewed in Assessment and Plan Section      Past Medical History:   Diagnosis Date    Anxiety     Substance abuse (HCC)      History reviewed. No pertinent surgical history.  Allergies: No Known Allergies    Current Outpatient Medications   Medication Instructions    buPROPion (WELLBUTRIN XL) 300 mg, Oral, Every morning    escitalopram (LEXAPRO) 10 mg, Oral, Daily    hydrOXYzine HCL (ATARAX) 10 mg, Oral, 2 times daily PRN    sildenafil (VIAGRA) 25 mg, Oral, Daily PRN        Substance Abuse History:    Tobacco, Alcohol and Drug Use History     Tobacco Use    Smoking status: Former     Current packs/day: 1.00     Average packs/day: 1 pack/day for 30.0 years (30.0 ttl pk-yrs)     Types: Cigarettes    Smokeless tobacco: Never   Substance Use Topics    Alcohol use: Not Currently    Drug use: Not Currently     Types: Cocaine     Alcohol Use: Not At Risk (8/27/2019)    AUDIT-C     Frequency of Alcohol Consumption: Never "       Social History:    Social History     Socioeconomic History    Marital status: Single     Spouse name: Not on file    Number of children: Not on file    Years of education: Not on file    Highest education level: Not on file   Occupational History    Occupation: [employer]    Other Topics Concern    Not on file   Social History Narrative    Single     Lives with roommates     No caffeine use     Regular exercise         Family Psychiatric History:     Family History   Problem Relation Age of Onset    Stroke Family     Hypertension Family     Mental illness Family        Medical History Reviewed by provider this encounter:  Tobacco  Allergies  Meds  Problems  Med Hx  Surg Hx  Fam Hx          Objective   There were no vitals taken for this visit.     Mental Status Evaluation:    Appearance age appropriate, casually dressed   Behavior cooperative, calm   Speech normal rate, normal volume, normal pitch, spontaneous   Mood euthymic   Affect normal range and intensity, appropriate   Thought Processes organized, goal directed   Thought Content no overt delusions   Perceptual Disturbances: no auditory hallucinations, no visual hallucinations   Abnormal Thoughts  Risk Potential Suicidal ideation - None  Homicidal ideation - None  Potential for aggression - No   Orientation oriented to person, place, time/date, and situation   Memory recent and remote memory grossly intact   Consciousness alert and awake   Attention Span Concentration Span attention span and concentration are age appropriate   Intellect appears to be of average intelligence   Insight intact and improving   Judgement intact and improving   Muscle Strength and  Gait normal muscle strength and normal muscle tone, normal gait and normal balance   Motor activity no abnormal movements   Language no difficulty naming common objects, no difficulty repeating a phrase, no difficulty writing a sentence   Fund of Knowledge adequate knowledge of current  "events  adequate fund of knowledge regarding past history  adequate fund of knowledge regarding vocabulary        Laboratory Results: I have personally reviewed all pertinent laboratory/tests results        Suicide/Homicide Risk Assessment:    Risk of Harm to Self:  Recent Specific Risk Factors include: current anxiety symptoms  Protective Factors: no current suicidal ideation, ability to adapt to change, compliant with medications, compliant with mental health treatment, connection to community  Based on today's assessment, Duane presents the following risk of harm to self: minimal    Risk of Harm to Others:  Recent Specific Risk Factors include: none  Based on today's assessment, Duane presents the following risk of harm to others: none    The following interventions are recommended: Continue medication management. No other intervention changes indicated at this time.    Psychotherapy Provided:     Individual psychotherapy provided: Yes    Reassurance and supportive therapy provided.     Treatment Plan:    Completed and signed during the session: Not applicable - Treatment Plan not due at this session.    Goals: Progress towards Treatment Plan goals - Yes, progressing, as evidenced by subjective findings in HPI/Subjective Section and in Assessment and Plan Section    Depression Follow-up Plan Completed: Yes    Note Share:    This note was not shared with the patient due to this is a psychotherapy note    Administrative Statements   Administrative Statements   I have spent a total time of 30 minutes in caring for this patient on the day of the visit/encounter including Counseling / Coordination of care.      Portions of the record may have been created with voice recognition software. Occasional wrong word or \"sound a like\" substitutions may have occurred due to the inherent limitations of voice recognition software. Read the chart carefully and recognize, using context, where substitutions have occurred.    Viviana" AUNDREA Garibay 05/07/25

## 2025-06-09 ENCOUNTER — OFFICE VISIT (OUTPATIENT)
Dept: FAMILY MEDICINE CLINIC | Facility: CLINIC | Age: 60
End: 2025-06-09
Payer: COMMERCIAL

## 2025-06-09 VITALS
OXYGEN SATURATION: 96 % | RESPIRATION RATE: 18 BRPM | DIASTOLIC BLOOD PRESSURE: 79 MMHG | HEIGHT: 71 IN | TEMPERATURE: 98.2 F | WEIGHT: 194.8 LBS | HEART RATE: 81 BPM | SYSTOLIC BLOOD PRESSURE: 127 MMHG | BODY MASS INDEX: 27.27 KG/M2

## 2025-06-09 DIAGNOSIS — R41.840 ATTENTION DEFICIT: ICD-10-CM

## 2025-06-09 DIAGNOSIS — K63.5 POLYP OF COLON, UNSPECIFIED PART OF COLON, UNSPECIFIED TYPE: ICD-10-CM

## 2025-06-09 DIAGNOSIS — E66.3 OVERWEIGHT WITH BODY MASS INDEX (BMI) OF 27 TO 27.9 IN ADULT: ICD-10-CM

## 2025-06-09 DIAGNOSIS — Z72.0 TOBACCO USER: ICD-10-CM

## 2025-06-09 DIAGNOSIS — H91.93 DECREASED HEARING OF BOTH EARS: ICD-10-CM

## 2025-06-09 DIAGNOSIS — E55.9 VITAMIN D DEFICIENCY: ICD-10-CM

## 2025-06-09 DIAGNOSIS — R35.1 NOCTURIA: ICD-10-CM

## 2025-06-09 DIAGNOSIS — Z13.1 SCREENING FOR DIABETES MELLITUS: ICD-10-CM

## 2025-06-09 DIAGNOSIS — Z00.00 ANNUAL PHYSICAL EXAM: Primary | ICD-10-CM

## 2025-06-09 DIAGNOSIS — F19.11 HISTORY OF SUBSTANCE ABUSE (HCC): Chronic | ICD-10-CM

## 2025-06-09 DIAGNOSIS — F41.1 GAD (GENERALIZED ANXIETY DISORDER): ICD-10-CM

## 2025-06-09 DIAGNOSIS — Z12.5 SCREENING FOR PROSTATE CANCER: ICD-10-CM

## 2025-06-09 DIAGNOSIS — Z23 NEED FOR VACCINATION: ICD-10-CM

## 2025-06-09 PROCEDURE — 99396 PREV VISIT EST AGE 40-64: CPT | Performed by: FAMILY MEDICINE

## 2025-06-09 PROCEDURE — 99214 OFFICE O/P EST MOD 30 MIN: CPT | Performed by: FAMILY MEDICINE

## 2025-06-09 NOTE — PROGRESS NOTES
Adult Annual Physical  Name: Duane E Grant      : 1965      MRN: [id number]  Encounter Provider: Lori Pagan MD  Encounter Date: 2025   Encounter department: [facility]    :  Assessment & Plan  Annual physical exam  Regarding his annual physical, patient is given age and diagnosis appropriate evaluation and care.  Patient is ordered the blood work and urine below which also includes screens for diabetes and his PSA for prostate cancer screening as discussed with patient and with his permission.  Patient strongly advised to quit tobacco completely.  Advised to lose weight with a better diet in excess.  Take a multivitamin.  Take vitamin D3 2 to 3000 units daily.  Advise routine self skin checks and use of sunscreen.  Orders:  •  UA w Reflex to Microscopic w Reflex to Culture; Future  •  CBC and differential; Future  •  Comprehensive metabolic panel; Future  •  TSH, 3rd generation with Free T4 reflex; Future  •  Lipid panel; Future  •  UA w Reflex to Microscopic w Reflex to Culture; Future    Tobacco user  Patient precontemplative.  Discussed with patient.  Patient counseled again to quit tobacco completely.  Patient sent for the CT of the lungs once again.  And he sent for the PFT.  He is also sent to the tobacco cessation program to help him quit.  Will continue to monitor and follow-up.  Orders:  •  CT lung screening program; Future  •  Complete PFT with post bronchodilator; Future  •  Ambulatory Referral to Smoking Cessation Program; Future    KAITLIN (generalized anxiety disorder)  Controlled.  Discussed with patient.  Patient briefly counseled.  Patient advised to continue his SSRI.  Patient denies SI HI.  Continue to monitor and follow-up.       Attention deficit  Controlled.  And better as per patient.  Patient briefly counseled.  Patient education.  DC tobacco.       Vitamin D deficiency  Discussed with patient.  Patient advised to take vitamin D3 2 to 2000 units  daily.  Check labs below.  Orders:  •  Vitamin D 25 hydroxy; Future    Nocturia  Discussed with patient.  Patient education.  Patient has ordered the renal bladder sono with PVR as well as his PSA and urinalysis.  Orders:  •  PSA, total and free; Future  •  US kidney and bladder with pvr; Future    Decreased hearing of both ears  Discussed with patient.  Patient is a patient.  Patient sent to the audiologist to be evaluated.  Orders:  •  Ambulatory Referral to Audiology; Future    Polyp of colon, unspecified part of colon, unspecified type  Discussed with patient.  Patient sent to GI to be scheduled for his colonoscopy when he is due.  Orders:  •  Ambulatory Referral to Gastroenterology; Future    History of substance abuse (HCC)  Stable.  Patient asserts that he no longer use any drugs for the last 6 years plus.  Discussed with patient.       Overweight with body mass index (BMI) of 27 to 27.9 in adult    Overweight with BMI of 27.56.  Advised patient to lose weight with a better diet and exercise.  Check the labs above.  DC tobacco.  Continue to monitor and follow-up.       Screening for diabetes mellitus  Discussed with patient.  Orders:  •  Hemoglobin A1C; Future    Screening for prostate cancer  Discussed with patient.  Orders:  •  PSA, total and free; Future    Need for vaccination  Discussed with patient.  Orders:  •  tetanus-diphtheria-acellular pertussis (ADACEL) 5-2-15.5 LF-mcg/0.5 injection; Inject 0.5 mL into a muscle once for 1 dose        RTO 4 weeks and do the blood work and urine before.        Preventive Screenings:  - Diabetes Screening: orders placed and risks/benefits discussed  - Cholesterol Screening: risks/benefits discussed and orders placed   - Hepatitis C screening: screening up-to-date   - HIV screening: screening up-to-date   - Colon cancer screening: screening up-to-date   - Lung cancer screening: risks/benefits discussed and orders placed   - Prostate cancer screening: risks/benefits  discussed and orders placed     Immunizations:  - Immunizations due: Tdap  - Risks/benefits immunizations discussed      Counseling/Anticipatory Guidance:  - Alcohol: discussed moderation in alcohol intake and recommendations for healthy alcohol use.   - Drug use: discussed harms of illicit drug use and how it can negatively impact mental/physical health.   - Tobacco use: discussed harms of tobacco use and management options for quitting.   - Dental health: discussed importance of regular tooth brushing, flossing, and dental visits.   - Sexual health: discussed sexually transmitted diseases, partner selection, use of condoms, avoidance of unintended pregnancy, and contraceptive alternatives.   - Diet: discussed recommendations for a healthy/well-balanced diet.   - Exercise: the importance of regular exercise/physical activity was discussed. Recommend exercise 3-5 times per week for at least 30 minutes.   - Injury prevention: discussed safety/seat belts, safety helmets, smoke detectors, carbon monoxide detectors, and smoking near bedding or upholstery.       Depression Screening and Follow-up Plan: Patient was screened for depression during today's encounter. They screened negative with a PHQ-2 score of 0.      Tobacco Cessation Counseling: Patient precontemplative.    Lung Cancer Screening Shared Decision Making: I discussed with him that he is a candidate for lung cancer CT screening.     The following Shared Decision-Making points were covered:  Benefits of screening were discussed, including the rates of reduction in death from lung cancer and other causes.  Harms of screening were reviewed, including false positive tests, radiation exposure levels, risks of invasive procedures, risks of complications of screening, and risk of overdiagnosis.  I counseled on the importance of adherence to annual lung cancer LDCT screening, impact of co-morbidities, and ability or willingness to undergo diagnosis and treatment.  I  counseled on the importance of maintaining abstinence as a former smoker or was counseled on the importance of smoking cessation if a current smoker    Review of Eligibility Criteria: He meets all of the criteria for Lung Cancer Screening.   - He is 59 y.o.   - He has 20 pack year tobacco history and is a current smoker or has quit within the past 15 years  - He presents no signs or symptoms of lung cancer    After discussion, the patient decided to elect lung cancer screening.        History of Present Illness     Adult Annual Physical:  Patient presents for annual physical. 59-year-old male here for his annual physical.  Patient was found to be evaluated for his cholesterol, anxiety, ADHD and nocturia.  Patient is a chronic smoker and precontemplative.  Patient was given labs including a CT of the lung and a renal bladder sonogram to do last year after his physical but he has not done those yet.  Patient is due for a colonoscopy next year secondary to his colon polyp history.  Patient does have anxiety and takes medication.  Patient denies SI HI.  Patient is up-to-date with his vaccines except he still needs the Tdap vaccine.  No history of an adverse reaction to vaccines in the past.  Patient denies using any drugs for the last 6 years as per patient..     Diet and Physical Activity:  - Diet/Nutrition: no special diet.  - Exercise: walking and 1-2 times a week on average.    Depression Screening:  - PHQ-2 Score: 0    General Health:  - Sleep: sleeps well.  - Hearing: decreased hearing bilateral ears.  - Vision:. Slightly decreased vision for reading.  - Dental: regular dental visits.     Health:  - History of STDs: no.   - Urinary symptoms: nocturia.     Advanced Care Planning:  - Has an advanced directive?: no    - Has a durable medical POA?: no      Review of Systems   Constitutional:  Negative for activity change, appetite change, chills, fatigue, fever and unexpected weight change.   HENT:  Positive for  "hearing loss. Negative for congestion and sore throat.    Eyes:  Negative for visual disturbance.   Respiratory:  Negative for cough and shortness of breath.    Cardiovascular:  Negative for chest pain and palpitations.   Gastrointestinal:  Negative for abdominal pain, blood in stool, constipation, diarrhea, nausea and vomiting.   Genitourinary:  Negative for dysuria and hematuria.   Musculoskeletal:  Negative for arthralgias.   Skin:  Negative for rash.   Neurological:  Negative for dizziness and headaches.   Psychiatric/Behavioral:  Positive for decreased concentration. Negative for dysphoric mood, self-injury and suicidal ideas. The patient is nervous/anxious.          Objective   /79 (BP Location: Left arm, Patient Position: Sitting, Cuff Size: Adult)   Pulse 81   Temp 98.2 °F (36.8 °C) (Temporal)   Resp 18   Ht 5' 10.5\" (1.791 m)   Wt 88.4 kg (194 lb 12.8 oz)   SpO2 96%   BMI 27.56 kg/m²     Physical Exam  Vitals reviewed.   Constitutional:       General: He is not in acute distress.     Appearance: Normal appearance. He is not ill-appearing.   HENT:      Head: Normocephalic and atraumatic.      Right Ear: Tympanic membrane, ear canal and external ear normal.      Left Ear: Tympanic membrane, ear canal and external ear normal.      Mouth/Throat:      Mouth: Mucous membranes are moist.      Pharynx: Oropharynx is clear.     Eyes:      Extraocular Movements: Extraocular movements intact.      Conjunctiva/sclera: Conjunctivae normal.     Neck:      Vascular: No carotid bruit.     Cardiovascular:      Rate and Rhythm: Normal rate and regular rhythm.   Pulmonary:      Effort: Pulmonary effort is normal.      Breath sounds: Normal breath sounds.   Abdominal:      Palpations: Abdomen is soft.      Tenderness: There is no abdominal tenderness. There is no right CVA tenderness or left CVA tenderness.     Musculoskeletal:         General: No tenderness.      Right lower leg: No edema.      Left lower leg: No " edema.      Comments: No calf tenderness bilateral.   Lymphadenopathy:      Cervical: No cervical adenopathy.     Skin:     General: Skin is warm.      Findings: No rash.     Neurological:      General: No focal deficit present.      Mental Status: He is alert and oriented to person, place, and time.     Psychiatric:         Mood and Affect: Mood normal.         Behavior: Behavior normal.         Thought Content: Thought content normal.

## 2025-06-09 NOTE — ASSESSMENT & PLAN NOTE
Controlled.  Discussed with patient.  Patient briefly counseled.  Patient advised to continue his SSRI.  Patient denies SI HI.  Continue to monitor and follow-up.

## 2025-06-09 NOTE — ASSESSMENT & PLAN NOTE
Controlled.  And better as per patient.  Patient briefly counseled.  Patient education.  DC tobacco.

## 2025-06-09 NOTE — ASSESSMENT & PLAN NOTE
Patient precontemplative.  Discussed with patient.  Patient counseled again to quit tobacco completely.  Patient sent for the CT of the lungs once again.  And he sent for the PFT.  He is also sent to the tobacco cessation program to help him quit.  Will continue to monitor and follow-up.  Orders:  •  CT lung screening program; Future  •  Complete PFT with post bronchodilator; Future  •  Ambulatory Referral to Smoking Cessation Program; Future

## 2025-06-09 NOTE — ASSESSMENT & PLAN NOTE
Stable.  Patient asserts that he no longer use any drugs for the last 6 years plus.  Discussed with patient.

## 2025-06-09 NOTE — ASSESSMENT & PLAN NOTE
Discussed with patient.  Patient is a patient.  Patient sent to the audiologist to be evaluated.  Orders:  •  Ambulatory Referral to Audiology; Future

## 2025-06-09 NOTE — ASSESSMENT & PLAN NOTE
Discussed with patient.  Patient sent to GI to be scheduled for his colonoscopy when he is due.  Orders:  •  Ambulatory Referral to Gastroenterology; Future

## 2025-06-09 NOTE — ASSESSMENT & PLAN NOTE
Discussed with patient.  Patient advised to take vitamin D3 2 to 2000 units daily.  Check labs below.  Orders:  •  Vitamin D 25 hydroxy; Future

## 2025-06-09 NOTE — ASSESSMENT & PLAN NOTE
Regarding his annual physical, patient is given age and diagnosis appropriate evaluation and care.  Patient is ordered the blood work and urine below which also includes screens for diabetes and his PSA for prostate cancer screening as discussed with patient and with his permission.  Patient strongly advised to quit tobacco completely.  Advised to lose weight with a better diet in excess.  Take a multivitamin.  Take vitamin D3 2 to 3000 units daily.  Advise routine self skin checks and use of sunscreen.  Orders:  •  UA w Reflex to Microscopic w Reflex to Culture; Future  •  CBC and differential; Future  •  Comprehensive metabolic panel; Future  •  TSH, 3rd generation with Free T4 reflex; Future  •  Lipid panel; Future  •  UA w Reflex to Microscopic w Reflex to Culture; Future

## 2025-06-09 NOTE — ASSESSMENT & PLAN NOTE
Discussed with patient.  Patient education.  Patient has ordered the renal bladder sono with PVR as well as his PSA and urinalysis.  Orders:  •  PSA, total and free; Future  •  US kidney and bladder with pvr; Future

## 2025-06-09 NOTE — ASSESSMENT & PLAN NOTE
Overweight with BMI of 27.56.  Advised patient to lose weight with a better diet and exercise.  Check the labs above.  DC tobacco.  Continue to monitor and follow-up.

## 2025-06-24 ENCOUNTER — OFFICE VISIT (OUTPATIENT)
Dept: PULMONOLOGY | Facility: CLINIC | Age: 60
End: 2025-06-24

## 2025-06-24 DIAGNOSIS — F17.210 CIGARETTE NICOTINE DEPENDENCE WITHOUT COMPLICATION: Primary | ICD-10-CM

## 2025-06-24 DIAGNOSIS — T65.291A TOXIC EFFECT OF OTHER TOBACCO AND NICOTINE, ACCIDENTAL (UNINTENTIONAL), INITIAL ENCOUNTER: ICD-10-CM

## 2025-06-24 DIAGNOSIS — F17.210 CIGARETTE NICOTINE DEPENDENCE WITHOUT COMPLICATION: ICD-10-CM

## 2025-06-24 PROCEDURE — SMOKECESS PR SMOKING CESSATION

## 2025-06-24 RX ORDER — NICOTINE 21 MG/24HR
1 PATCH, TRANSDERMAL 24 HOURS TRANSDERMAL EVERY 24 HOURS
Qty: 28 PATCH | Refills: 0 | Status: SHIPPED | OUTPATIENT
Start: 2025-06-24

## 2025-06-24 RX ORDER — POLYETHYLENE GLYCOL 3350 17 G
2 POWDER IN PACKET (EA) ORAL AS NEEDED
Qty: 100 EACH | Refills: 0 | Status: SHIPPED | OUTPATIENT
Start: 2025-06-24 | End: 2025-06-25

## 2025-06-24 NOTE — PROGRESS NOTES
Smoking Cessation Program Consult  Name: Duane E Grant      : 1965      MRN: [id number]  Encounter Provider: Nicola Brandt RN  Encounter Date: 2025   Encounter department: [facility]  :  Assessment & Plan        I advised patient to quit, and offered support.  Discussed current use pattern.  Asked patient to inform me when they set a quit date.  Nicotine patches beginning at 14 mg.  Nicotine gum. 2mg  Nicotine Lozenge 2mg  Follow up in As Needed As needed or as needed.  I spent approximately 30 minutes counseling the patient.  Patient attended initial smoking cessation visit in person.  Patient is smoking 5 to 8 cigarettes/day and has cut back from 1 pack/day.  Patient has first cigarette within 5 minutes of waking signaling severe dependence on nicotine. Patient started smoking at 17 years old and biggest barriers to quitting include stress and boredom.  Patient has 2 past quit attempts where he was able to quit for 2 weeks and 1 month on his own cold turkey.  Patient's reason for quitting now is fear of COPD and health reasons.  Patient is moderately motivated and moderately confident that he will quit smoking long-term.  Patient is in the action stage of change.  Patient like to set quit date within the next month.  Discussed all 7 FDA-approved medications with patient including patches gum lozenges nasal spray Chantix and Wellbutrin.  Patient is interested in using 14 mg patch 2 mg lozenges and 2 mg gum.  Education was provided on proper use of medications.  Patient was agreeable and teach back was successful.  Patient would not like to follow-up in 1 month and would like to follow-up as needed.  Barriers to quitting include: under a lot of stress now.     Dependence Severity:  8    Surgical Optimization Plan:  NA    No follow-ups on file.    History of Present Illness     Duane E Grant is a 59 y.o. male who presents nicotine dependence    Smoking history:  What  "type of tobacco product used:  Cigarettes    How many Packs of Cigarettes per day on average:  1/3 PPD    Largest amount of tobacco EVER used regularly: 1 PPD    How old when started using Tobacco regularly:  17 years old    How many years using tobacco regularly:  40 years    How soon after waking up do you smoke:  Within 5 minutes    Past Attempts to go Tobacco free:   Intentionally Cut Down or limit use:  YES/NO: yes     How many serious attempt to go tobacco free:  More than once, 2      Last Serious attempt:   Over a year ago      During last attemp, How long without use?  1 - 30 days     Which symptoms experienced when tried to go Tobacco Free:  Difficulty Concentrating, Difficulty sleeping, and Irritability     Methods to used on prior attempt:  None / \"Cold Turkey\"     Desire to Quit:  Quit within the next month    Motivations to Quit:  Health Concerns    Scale on 1-10, how motivated:  5    Has a Healthcare Professional recommend smoking cessation?  Yes  What Obstacles do you face for quitting:  Sudden Impulses    From 1-10, how confident are you that you will bee Tobacco free in 6 Months?    5 1: Not confident, 10 Extremely Confident    Review of Systems as per HPI         Medical History Reviewed by provider this encounter:     .  He reports that he has been smoking cigarettes. He has a 30 pack-year smoking history. He has never used smokeless tobacco. He reports that he does not currently use alcohol. He reports that he does not currently use drugs after having used the following drugs: Cocaine.       Objective   There were no vitals taken for this visit.    Physical Exam  Constitutional:       Appearance: Normal appearance.     Cardiovascular:      Rate and Rhythm: Normal rate and regular rhythm.      Pulses: Normal pulses.      Heart sounds: Normal heart sounds.   Pulmonary:      Effort: Pulmonary effort is normal.      Breath sounds: Normal breath sounds.     Skin:     General: Skin is warm and dry.    " "  Capillary Refill: Capillary refill takes less than 2 seconds.     Neurological:      General: No focal deficit present.      Mental Status: He is alert and oriented to person, place, and time.     Psychiatric:         Mood and Affect: Mood normal.         Behavior: Behavior normal.         Thought Content: Thought content normal.         Judgment: Judgment normal.           Diagnostic Data:  Labs: I personally reviewed the most recent laboratory data pertinent to today's visit.      Radiology results:        PFT/spirometry results:  No results found for: \"FEV1\", \"FVC\", \"SVJ3ZQO\", \"TLC\", \"DLCO\"       Oximetry testing:      Other studies:        I spent 30 minutes with patient today in which >10 minutes of the time was spent in counseling/coordination of care regarding tobacco cessation    Nicola Brandt RN  "

## 2025-06-25 RX ORDER — POLYETHYLENE GLYCOL 3350 17 G
2 POWDER IN PACKET (EA) ORAL AS NEEDED
Qty: 81 LOZENGE | Refills: 0 | Status: SHIPPED | OUTPATIENT
Start: 2025-06-25

## 2025-06-30 ENCOUNTER — HOSPITAL ENCOUNTER (OUTPATIENT)
Dept: RADIOLOGY | Facility: HOSPITAL | Age: 60
Discharge: HOME | End: 2025-06-30
Attending: FAMILY MEDICINE
Payer: COMMERCIAL

## 2025-06-30 DIAGNOSIS — R35.1 NOCTURIA: ICD-10-CM

## 2025-06-30 PROCEDURE — 76770 US EXAM ABDO BACK WALL COMP: CPT

## 2025-07-07 ENCOUNTER — HOSPITAL ENCOUNTER (OUTPATIENT)
Dept: PULMONOLOGY | Facility: HOSPITAL | Age: 60
Discharge: HOME | End: 2025-07-07
Attending: FAMILY MEDICINE
Payer: COMMERCIAL

## 2025-07-07 DIAGNOSIS — Z72.0 TOBACCO USER: ICD-10-CM

## 2025-07-07 PROCEDURE — 94760 N-INVAS EAR/PLS OXIMETRY 1: CPT

## 2025-07-07 PROCEDURE — 94729 DIFFUSING CAPACITY: CPT

## 2025-07-07 PROCEDURE — 94729 DIFFUSING CAPACITY: CPT | Performed by: INTERNAL MEDICINE

## 2025-07-07 PROCEDURE — 94060 EVALUATION OF WHEEZING: CPT

## 2025-07-07 PROCEDURE — 94726 PLETHYSMOGRAPHY LUNG VOLUMES: CPT

## 2025-07-07 PROCEDURE — 94060 EVALUATION OF WHEEZING: CPT | Performed by: INTERNAL MEDICINE

## 2025-07-07 PROCEDURE — 94726 PLETHYSMOGRAPHY LUNG VOLUMES: CPT | Performed by: INTERNAL MEDICINE

## 2025-07-07 RX ORDER — ALBUTEROL SULFATE 0.83 MG/ML
2.5 SOLUTION RESPIRATORY (INHALATION) ONCE
Status: COMPLETED | OUTPATIENT
Start: 2025-07-07 | End: 2025-07-07

## 2025-07-07 RX ADMIN — ALBUTEROL SULFATE 2.5 MG: 2.5 SOLUTION RESPIRATORY (INHALATION) at 14:21

## 2025-07-14 ENCOUNTER — APPOINTMENT (OUTPATIENT)
Dept: LAB | Facility: CLINIC | Age: 60
End: 2025-07-14
Payer: COMMERCIAL

## 2025-07-14 ENCOUNTER — TELEPHONE (OUTPATIENT)
Age: 60
End: 2025-07-14

## 2025-07-14 DIAGNOSIS — R35.1 NOCTURIA: ICD-10-CM

## 2025-07-14 DIAGNOSIS — Z13.1 SCREENING FOR DIABETES MELLITUS: ICD-10-CM

## 2025-07-14 DIAGNOSIS — Z12.5 SCREENING FOR PROSTATE CANCER: ICD-10-CM

## 2025-07-14 DIAGNOSIS — E55.9 VITAMIN D DEFICIENCY: ICD-10-CM

## 2025-07-14 DIAGNOSIS — Z00.00 ANNUAL PHYSICAL EXAM: ICD-10-CM

## 2025-07-14 LAB
25(OH)D3 SERPL-MCNC: 36 NG/ML (ref 30–100)
ALBUMIN SERPL BCG-MCNC: 3.8 G/DL (ref 3.5–5)
ALP SERPL-CCNC: 77 U/L (ref 34–104)
ALT SERPL W P-5'-P-CCNC: 21 U/L (ref 7–52)
ANION GAP SERPL CALCULATED.3IONS-SCNC: 6 MMOL/L (ref 4–13)
AST SERPL W P-5'-P-CCNC: 27 U/L (ref 13–39)
BASOPHILS # BLD AUTO: 0.02 THOUSANDS/ÂΜL (ref 0–0.1)
BASOPHILS NFR BLD AUTO: 0 % (ref 0–1)
BILIRUB SERPL-MCNC: 0.36 MG/DL (ref 0.2–1)
BILIRUB UR QL STRIP: NEGATIVE
BUN SERPL-MCNC: 16 MG/DL (ref 5–25)
CALCIUM SERPL-MCNC: 9.1 MG/DL (ref 8.4–10.2)
CHLORIDE SERPL-SCNC: 105 MMOL/L (ref 96–108)
CHOLEST SERPL-MCNC: 165 MG/DL (ref ?–200)
CLARITY UR: CLEAR
CO2 SERPL-SCNC: 29 MMOL/L (ref 21–32)
COLOR UR: NORMAL
CREAT SERPL-MCNC: 1.25 MG/DL (ref 0.6–1.3)
EOSINOPHIL # BLD AUTO: 0.09 THOUSAND/ÂΜL (ref 0–0.61)
EOSINOPHIL NFR BLD AUTO: 2 % (ref 0–6)
ERYTHROCYTE [DISTWIDTH] IN BLOOD BY AUTOMATED COUNT: 14.6 % (ref 11.6–15.1)
EST. AVERAGE GLUCOSE BLD GHB EST-MCNC: 128 MG/DL
GFR SERPL CREATININE-BSD FRML MDRD: 62 ML/MIN/1.73SQ M
GLUCOSE P FAST SERPL-MCNC: 88 MG/DL (ref 65–99)
GLUCOSE UR STRIP-MCNC: NEGATIVE MG/DL
HBA1C MFR BLD: 6.1 %
HCT VFR BLD AUTO: 48.3 % (ref 36.5–49.3)
HDLC SERPL-MCNC: 51 MG/DL
HGB BLD-MCNC: 15.4 G/DL (ref 12–17)
HGB UR QL STRIP.AUTO: NEGATIVE
IMM GRANULOCYTES # BLD AUTO: 0.01 THOUSAND/UL (ref 0–0.2)
IMM GRANULOCYTES NFR BLD AUTO: 0 % (ref 0–2)
KETONES UR STRIP-MCNC: NEGATIVE MG/DL
LDLC SERPL CALC-MCNC: 98 MG/DL (ref 0–100)
LEUKOCYTE ESTERASE UR QL STRIP: NEGATIVE
LYMPHOCYTES # BLD AUTO: 1.87 THOUSANDS/ÂΜL (ref 0.6–4.47)
LYMPHOCYTES NFR BLD AUTO: 34 % (ref 14–44)
MCH RBC QN AUTO: 29.4 PG (ref 26.8–34.3)
MCHC RBC AUTO-ENTMCNC: 31.9 G/DL (ref 31.4–37.4)
MCV RBC AUTO: 92 FL (ref 82–98)
MONOCYTES # BLD AUTO: 0.47 THOUSAND/ÂΜL (ref 0.17–1.22)
MONOCYTES NFR BLD AUTO: 8 % (ref 4–12)
NEUTROPHILS # BLD AUTO: 3.11 THOUSANDS/ÂΜL (ref 1.85–7.62)
NEUTS SEG NFR BLD AUTO: 56 % (ref 43–75)
NITRITE UR QL STRIP: NEGATIVE
NONHDLC SERPL-MCNC: 114 MG/DL
NRBC BLD AUTO-RTO: 0 /100 WBCS
PH UR STRIP.AUTO: 6 [PH]
PLATELET # BLD AUTO: 273 THOUSANDS/UL (ref 149–390)
PMV BLD AUTO: 10.1 FL (ref 8.9–12.7)
POTASSIUM SERPL-SCNC: 3.9 MMOL/L (ref 3.5–5.3)
PROT SERPL-MCNC: 7.3 G/DL (ref 6.4–8.4)
PROT UR STRIP-MCNC: NEGATIVE MG/DL
PSA FREE MFR SERPL: 18.03 %
PSA FREE SERPL-MCNC: 0.23 NG/ML
PSA SERPL-MCNC: 1.26 NG/ML (ref 0–4)
RBC # BLD AUTO: 5.24 MILLION/UL (ref 3.88–5.62)
SODIUM SERPL-SCNC: 140 MMOL/L (ref 135–147)
SP GR UR STRIP.AUTO: 1.02 (ref 1–1.03)
TRIGL SERPL-MCNC: 82 MG/DL (ref ?–150)
TSH SERPL DL<=0.05 MIU/L-ACNC: 1.38 UIU/ML (ref 0.45–4.5)
UROBILINOGEN UR STRIP-ACNC: <2 MG/DL
WBC # BLD AUTO: 5.57 THOUSAND/UL (ref 4.31–10.16)

## 2025-07-14 PROCEDURE — 83036 HEMOGLOBIN GLYCOSYLATED A1C: CPT

## 2025-07-14 PROCEDURE — 84153 ASSAY OF PSA TOTAL: CPT

## 2025-07-14 PROCEDURE — 84154 ASSAY OF PSA FREE: CPT

## 2025-07-14 PROCEDURE — 80053 COMPREHEN METABOLIC PANEL: CPT

## 2025-07-14 PROCEDURE — 36415 COLL VENOUS BLD VENIPUNCTURE: CPT

## 2025-07-14 PROCEDURE — 81003 URINALYSIS AUTO W/O SCOPE: CPT

## 2025-07-14 PROCEDURE — 85025 COMPLETE CBC W/AUTO DIFF WBC: CPT

## 2025-07-14 PROCEDURE — 82306 VITAMIN D 25 HYDROXY: CPT

## 2025-07-14 PROCEDURE — 80061 LIPID PANEL: CPT

## 2025-07-14 PROCEDURE — 84443 ASSAY THYROID STIM HORMONE: CPT

## 2025-07-20 DIAGNOSIS — F17.210 CIGARETTE NICOTINE DEPENDENCE WITHOUT COMPLICATION: ICD-10-CM

## 2025-07-20 DIAGNOSIS — T65.291A TOXIC EFFECT OF OTHER TOBACCO AND NICOTINE, ACCIDENTAL (UNINTENTIONAL), INITIAL ENCOUNTER: ICD-10-CM

## 2025-07-21 ENCOUNTER — OFFICE VISIT (OUTPATIENT)
Dept: FAMILY MEDICINE CLINIC | Facility: CLINIC | Age: 60
End: 2025-07-21
Payer: COMMERCIAL

## 2025-07-21 VITALS
RESPIRATION RATE: 16 BRPM | OXYGEN SATURATION: 99 % | BODY MASS INDEX: 26.91 KG/M2 | TEMPERATURE: 98.2 F | HEART RATE: 88 BPM | HEIGHT: 71 IN | WEIGHT: 192.2 LBS | DIASTOLIC BLOOD PRESSURE: 85 MMHG | SYSTOLIC BLOOD PRESSURE: 132 MMHG

## 2025-07-21 DIAGNOSIS — R73.03 PREDIABETES: Primary | ICD-10-CM

## 2025-07-21 DIAGNOSIS — E55.9 VITAMIN D DEFICIENCY: ICD-10-CM

## 2025-07-21 DIAGNOSIS — E78.00 PURE HYPERCHOLESTEROLEMIA: ICD-10-CM

## 2025-07-21 DIAGNOSIS — F41.1 GAD (GENERALIZED ANXIETY DISORDER): ICD-10-CM

## 2025-07-21 DIAGNOSIS — N18.2 CKD (CHRONIC KIDNEY DISEASE) STAGE 2, GFR 60-89 ML/MIN: ICD-10-CM

## 2025-07-21 DIAGNOSIS — N40.0 ENLARGED PROSTATE: ICD-10-CM

## 2025-07-21 DIAGNOSIS — Z72.0 TOBACCO USER: ICD-10-CM

## 2025-07-21 PROCEDURE — 99214 OFFICE O/P EST MOD 30 MIN: CPT | Performed by: FAMILY MEDICINE

## 2025-07-21 RX ORDER — NICOTINE 21 MG/24HR
1 PATCH, TRANSDERMAL 24 HOURS TRANSDERMAL EVERY 24 HOURS
Qty: 28 PATCH | Refills: 0 | Status: SHIPPED | OUTPATIENT
Start: 2025-07-21

## 2025-07-21 NOTE — ASSESSMENT & PLAN NOTE
Very recently DC'd tobacco on the 18th of this month as per patient.  Discussed with patient.  Patient education.  Patient is ordered a CT of the lung that I readvised him today to get that done.  Will continue to monitor and assure that he does not return to smoking ever again.

## 2025-07-21 NOTE — PROGRESS NOTES
Name: Duane E Grant      : 1965      MRN: [id number]  Encounter Provider: Lori Pagan MD  Encounter Date: 2025   Encounter department: [facility]  :  Assessment & Plan  Prediabetes  New diagnosis.  Controlled.  His glucose was 88 and his A1c is 6.1.  Advised patient to cut down on his starches sweets and fats.  Lose weight with a better diet and exercise.  DC tobacco.  Recheck labs again in 6 months.  Orders:  •  Comprehensive metabolic panel; Future  •  Hemoglobin A1C; Future  •  Albumin / creatinine urine ratio; Future  •  UA w Reflex to Microscopic w Reflex to Culture; Future    Pure hypercholesterolemia  Stable and controlled.  LFTs normal.  His total cholesterol 165, triglycerides 82, HDL 51 and his LDL went down to 98 from 104.  Advised patient to continue cutting down his fats starches and sweets.  Lose weight with a better diet and exercise.  Recheck labs again in 6 months.  Orders:  •  Comprehensive metabolic panel; Future  •  Lipid panel; Future    CKD (chronic kidney disease) stage 2, GFR 60-89 ml/min  Lab Results   Component Value Date    EGFR 62 2025    EGFR 57 2023    CREATININE 1.25 2025    CREATININE 1.34 (H) 2023   New diagnosis.  Stable.  Advised patient well/more hydration.  DC tobacco.  Advised patient prediabetes control and blood pressure control.  Labs reviewed and recheck labs in 6 months.  Of note patient did have a renal bladder sonogram which showed bilateral renal cyst and PVR slightly elevated.  Patient was appropriately advised.    Orders:  •  Comprehensive metabolic panel; Future  •  Albumin / creatinine urine ratio; Future  •  UA w Reflex to Microscopic w Reflex to Culture; Future    Enlarged prostate  As seen on his renal bladder sonogram with PVR.  Patient also has slight elevated PVR.  Discussed with patient.  Patient education.  Patient referred to the urologist.  Orders:  •  Ambulatory Referral to Urology;  Future    Vitamin D deficiency  Normal now.  Advised patient to have supplementation.       Tobacco user  Very recently DC'd tobacco on the 18th of this month as per patient.  Discussed with patient.  Patient education.  Patient is ordered a CT of the lung that I readvised him today to get that done.  Will continue to monitor and assure that he does not return to smoking ever again.       KAITLIN (generalized anxiety disorder)  Relatively stable.  Discussed with patient.  Patient briefly counseled.  Patient denies SI HI.  Will continue to monitor and follow-up.           RTO 6 months and do the labs 1 week before.  Patient is any prior to that for anything else in between.          Lung Cancer Screening Shared Decision Making: I discussed with him that he is a candidate for lung cancer CT screening.     The following Shared Decision-Making points were covered:  Benefits of screening were discussed, including the rates of reduction in death from lung cancer and other causes.  Harms of screening were reviewed, including false positive tests, radiation exposure levels, risks of invasive procedures, risks of complications of screening, and risk of overdiagnosis.  I counseled on the importance of adherence to annual lung cancer LDCT screening, impact of co-morbidities, and ability or willingness to undergo diagnosis and treatment.  I counseled on the importance of maintaining abstinence as a former smoker or was counseled on the importance of smoking cessation if a current smoker    Review of Eligibility Criteria: He meets all of the criteria for Lung Cancer Screening.   - He is 60 y.o.   - He has 20 pack year tobacco history and is a current smoker or has quit within the past 15 years  - He presents no signs or symptoms of lung cancer    After discussion, the patient decided to elect lung cancer screening.      History of Present Illness   60-year-old male here to be evaluated for his cholesterol, sugar and smoking.   "Patient did labs recently.  Patient also did a renal bladder sonogram with PVR.  Patient is ordered the CT of the lung by me but has not done yet.  Patient has seen GI and asserts that he will be scheduled for a colonoscopy when he is due.  Patient is a chronic smoker and asserts that he quit last Friday.  Patient is nervous regarding his lab results hence why his blood pressure and heart rate were a little bit high upon arrival.  But when I rechecked them when we were done with the visit they had improved.      Review of Systems   Constitutional:  Negative for chills, fatigue, fever and unexpected weight change.   HENT:  Negative for congestion and sore throat.    Eyes:  Negative for visual disturbance.   Respiratory:  Negative for cough and shortness of breath.    Cardiovascular:  Negative for chest pain and palpitations.   Gastrointestinal:  Negative for abdominal pain.   Genitourinary:  Negative for dysuria.   Neurological:  Negative for dizziness and headaches.   Psychiatric/Behavioral:  Negative for self-injury and suicidal ideas. The patient is nervous/anxious.        Objective   /85   Pulse 88   Temp 98.2 °F (36.8 °C) (Temporal)   Resp 16   Ht 5' 10.5\" (1.791 m)   Wt 87.2 kg (192 lb 3.2 oz)   SpO2 99%   BMI 27.19 kg/m²      Physical Exam  Vitals reviewed.   Constitutional:       General: He is not in acute distress.     Appearance: Normal appearance. He is not ill-appearing.   HENT:      Head: Normocephalic and atraumatic.      Right Ear: Tympanic membrane, ear canal and external ear normal.      Left Ear: Tympanic membrane, ear canal and external ear normal.      Mouth/Throat:      Mouth: Mucous membranes are moist.      Pharynx: Oropharynx is clear.     Eyes:      Extraocular Movements: Extraocular movements intact.      Conjunctiva/sclera: Conjunctivae normal.     Neck:      Vascular: No carotid bruit.     Cardiovascular:      Rate and Rhythm: Normal rate and regular rhythm.   Pulmonary:      " Effort: Pulmonary effort is normal.      Breath sounds: Normal breath sounds.     Musculoskeletal:      Right lower leg: No edema.      Left lower leg: No edema.   Lymphadenopathy:      Cervical: No cervical adenopathy.     Neurological:      General: No focal deficit present.      Mental Status: He is alert and oriented to person, place, and time.     Psychiatric:         Mood and Affect: Mood normal.         Behavior: Behavior normal.         Thought Content: Thought content normal.

## 2025-07-21 NOTE — ASSESSMENT & PLAN NOTE
As seen on his renal bladder sonogram with PVR.  Patient also has slight elevated PVR.  Discussed with patient.  Patient education.  Patient referred to the urologist.  Orders:  •  Ambulatory Referral to Urology; Future

## 2025-07-21 NOTE — ASSESSMENT & PLAN NOTE
Relatively stable.  Discussed with patient.  Patient briefly counseled.  Patient denies SI HI.  Will continue to monitor and follow-up.

## 2025-07-21 NOTE — ASSESSMENT & PLAN NOTE
New diagnosis.  Controlled.  His glucose was 88 and his A1c is 6.1.  Advised patient to cut down on his starches sweets and fats.  Lose weight with a better diet and exercise.  DC tobacco.  Recheck labs again in 6 months.  Orders:  •  Comprehensive metabolic panel; Future  •  Hemoglobin A1C; Future  •  Albumin / creatinine urine ratio; Future  •  UA w Reflex to Microscopic w Reflex to Culture; Future

## 2025-07-21 NOTE — ASSESSMENT & PLAN NOTE
Stable and controlled.  LFTs normal.  His total cholesterol 165, triglycerides 82, HDL 51 and his LDL went down to 98 from 104.  Advised patient to continue cutting down his fats starches and sweets.  Lose weight with a better diet and exercise.  Recheck labs again in 6 months.  Orders:  •  Comprehensive metabolic panel; Future  •  Lipid panel; Future

## 2025-07-21 NOTE — ASSESSMENT & PLAN NOTE
Lab Results   Component Value Date    EGFR 62 07/14/2025    EGFR 57 06/06/2023    CREATININE 1.25 07/14/2025    CREATININE 1.34 (H) 06/06/2023   New diagnosis.  Stable.  Advised patient well/more hydration.  DC tobacco.  Advised patient prediabetes control and blood pressure control.  Labs reviewed and recheck labs in 6 months.  Of note patient did have a renal bladder sonogram which showed bilateral renal cyst and PVR slightly elevated.  Patient was appropriately advised.    Orders:  •  Comprehensive metabolic panel; Future  •  Albumin / creatinine urine ratio; Future  •  UA w Reflex to Microscopic w Reflex to Culture; Future

## 2025-07-29 ENCOUNTER — TELEPHONE (OUTPATIENT)
Age: 60
End: 2025-07-29

## 2025-08-18 ENCOUNTER — OFFICE VISIT (OUTPATIENT)
Dept: UROLOGY | Facility: MEDICAL CENTER | Age: 60
End: 2025-08-18
Payer: COMMERCIAL

## 2025-08-18 VITALS
HEART RATE: 87 BPM | HEIGHT: 71 IN | BODY MASS INDEX: 27.19 KG/M2 | OXYGEN SATURATION: 97 % | DIASTOLIC BLOOD PRESSURE: 90 MMHG | SYSTOLIC BLOOD PRESSURE: 150 MMHG

## 2025-08-18 DIAGNOSIS — N40.0 ENLARGED PROSTATE: Primary | ICD-10-CM

## 2025-08-18 LAB — POST-VOID RESIDUAL VOLUME, ML POC: 41 ML

## 2025-08-18 PROCEDURE — 99204 OFFICE O/P NEW MOD 45 MIN: CPT

## 2025-08-18 PROCEDURE — 51798 US URINE CAPACITY MEASURE: CPT
